# Patient Record
Sex: FEMALE | Race: BLACK OR AFRICAN AMERICAN | Employment: UNEMPLOYED | RURAL
[De-identification: names, ages, dates, MRNs, and addresses within clinical notes are randomized per-mention and may not be internally consistent; named-entity substitution may affect disease eponyms.]

---

## 2019-06-26 ENCOUNTER — HOME HEALTH ADMISSION (OUTPATIENT)
Dept: HOME HEALTH SERVICES | Facility: HOME HEALTH | Age: 64
End: 2019-06-26

## 2019-07-24 NOTE — TELEPHONE ENCOUNTER
Refill request for Docusate Sodium received in the [facility] office. The pt is not a [facility] pt nor is the provider a [facility] provider. The request was faxed back to the Pharmacy denied with the reason's why.  Heather Kraus RN

## 2021-10-12 ENCOUNTER — APPOINTMENT (OUTPATIENT)
Dept: CT IMAGING | Age: 66
End: 2021-10-12
Attending: EMERGENCY MEDICINE
Payer: MEDICARE

## 2021-10-12 ENCOUNTER — HOSPITAL ENCOUNTER (EMERGENCY)
Age: 66
Discharge: HOME | End: 2021-10-12
Attending: EMERGENCY MEDICINE
Payer: MEDICARE

## 2021-10-12 VITALS
TEMPERATURE: 97.5 F | HEART RATE: 61 BPM | DIASTOLIC BLOOD PRESSURE: 80 MMHG | RESPIRATION RATE: 20 BRPM | SYSTOLIC BLOOD PRESSURE: 113 MMHG | OXYGEN SATURATION: 98 %

## 2021-10-12 DIAGNOSIS — K59.00 CONSTIPATION, UNSPECIFIED CONSTIPATION TYPE: ICD-10-CM

## 2021-10-12 DIAGNOSIS — E87.6 HYPOKALEMIA: Primary | ICD-10-CM

## 2021-10-12 DIAGNOSIS — J90 PLEURAL EFFUSION: ICD-10-CM

## 2021-10-12 LAB
ALBUMIN SERPL-MCNC: 2.8 G/DL (ref 3.5–5)
ALBUMIN/GLOB SERPL: 0.5 {RATIO} (ref 1.1–2.2)
ALP SERPL-CCNC: 72 U/L (ref 45–117)
ALT SERPL-CCNC: 13 U/L (ref 12–78)
ANION GAP SERPL CALC-SCNC: 6 MMOL/L (ref 5–15)
AST SERPL-CCNC: 10 U/L (ref 15–37)
BASOPHILS # BLD: 0 K/UL (ref 0–0.1)
BASOPHILS NFR BLD: 0 % (ref 0–1)
BILIRUB SERPL-MCNC: 0.2 MG/DL (ref 0.2–1)
BUN SERPL-MCNC: 12 MG/DL (ref 6–20)
BUN/CREAT SERPL: 17 (ref 12–20)
CALCIUM SERPL-MCNC: 9 MG/DL (ref 8.5–10.1)
CHLORIDE SERPL-SCNC: 114 MMOL/L (ref 97–108)
CO2 SERPL-SCNC: 22 MMOL/L (ref 21–32)
COMMENT, HOLDF: NORMAL
CREAT SERPL-MCNC: 0.71 MG/DL (ref 0.55–1.02)
DIFFERENTIAL METHOD BLD: ABNORMAL
EOSINOPHIL # BLD: 0 K/UL (ref 0–0.4)
EOSINOPHIL NFR BLD: 0 % (ref 0–7)
ERYTHROCYTE [DISTWIDTH] IN BLOOD BY AUTOMATED COUNT: 18.1 % (ref 11.5–14.5)
GLOBULIN SER CALC-MCNC: 5.8 G/DL (ref 2–4)
GLUCOSE SERPL-MCNC: 100 MG/DL (ref 65–100)
HCT VFR BLD AUTO: 34.8 % (ref 35–47)
HGB BLD-MCNC: 11.2 G/DL (ref 11.5–16)
IMM GRANULOCYTES # BLD AUTO: 0 K/UL (ref 0–0.04)
IMM GRANULOCYTES NFR BLD AUTO: 0 % (ref 0–0.5)
LIPASE SERPL-CCNC: 173 U/L (ref 73–393)
LYMPHOCYTES # BLD: 0.7 K/UL (ref 0.8–3.5)
LYMPHOCYTES NFR BLD: 16 % (ref 12–49)
MAGNESIUM SERPL-MCNC: 2.1 MG/DL (ref 1.6–2.4)
MCH RBC QN AUTO: 28.2 PG (ref 26–34)
MCHC RBC AUTO-ENTMCNC: 32.2 G/DL (ref 30–36.5)
MCV RBC AUTO: 87.7 FL (ref 80–99)
MONOCYTES # BLD: 0.4 K/UL (ref 0–1)
MONOCYTES NFR BLD: 9 % (ref 5–13)
NEUTS SEG # BLD: 3.5 K/UL (ref 1.8–8)
NEUTS SEG NFR BLD: 75 % (ref 32–75)
NRBC # BLD: 0 K/UL (ref 0–0.01)
NRBC BLD-RTO: 0 PER 100 WBC
PLATELET # BLD AUTO: 271 K/UL (ref 150–400)
PMV BLD AUTO: 11.8 FL (ref 8.9–12.9)
POTASSIUM SERPL-SCNC: 3.2 MMOL/L (ref 3.5–5.1)
PROT SERPL-MCNC: 8.6 G/DL (ref 6.4–8.2)
RBC # BLD AUTO: 3.97 M/UL (ref 3.8–5.2)
RBC MORPH BLD: ABNORMAL
SAMPLES BEING HELD,HOLD: NORMAL
SODIUM SERPL-SCNC: 142 MMOL/L (ref 136–145)
WBC # BLD AUTO: 4.6 K/UL (ref 3.6–11)

## 2021-10-12 PROCEDURE — 74011250637 HC RX REV CODE- 250/637: Performed by: EMERGENCY MEDICINE

## 2021-10-12 PROCEDURE — 99284 EMERGENCY DEPT VISIT MOD MDM: CPT

## 2021-10-12 PROCEDURE — 83735 ASSAY OF MAGNESIUM: CPT

## 2021-10-12 PROCEDURE — 83690 ASSAY OF LIPASE: CPT

## 2021-10-12 PROCEDURE — 74011000636 HC RX REV CODE- 636: Performed by: EMERGENCY MEDICINE

## 2021-10-12 PROCEDURE — 36415 COLL VENOUS BLD VENIPUNCTURE: CPT

## 2021-10-12 PROCEDURE — 80053 COMPREHEN METABOLIC PANEL: CPT

## 2021-10-12 PROCEDURE — 85025 COMPLETE CBC W/AUTO DIFF WBC: CPT

## 2021-10-12 PROCEDURE — 74177 CT ABD & PELVIS W/CONTRAST: CPT

## 2021-10-12 RX ORDER — FACIAL-BODY WIPES
10 EACH TOPICAL
Qty: 28 EACH | Refills: 0 | Status: SHIPPED | OUTPATIENT
Start: 2021-10-12 | End: 2021-10-12 | Stop reason: SDUPTHER

## 2021-10-12 RX ORDER — LACTULOSE 10 G/15ML
10 SOLUTION ORAL; RECTAL
Qty: 480 ML | Refills: 0 | Status: SHIPPED | OUTPATIENT
Start: 2021-10-12 | End: 2021-10-12 | Stop reason: SDUPTHER

## 2021-10-12 RX ORDER — DOCUSATE SODIUM 100 MG/1
100 CAPSULE, LIQUID FILLED ORAL 2 TIMES DAILY
Qty: 60 CAPSULE | Refills: 2 | Status: SHIPPED | OUTPATIENT
Start: 2021-10-12 | End: 2022-01-10

## 2021-10-12 RX ORDER — LACTULOSE 10 G/15ML
10 SOLUTION ORAL; RECTAL
Qty: 480 ML | Refills: 0 | Status: SHIPPED | OUTPATIENT
Start: 2021-10-12 | End: 2021-10-20

## 2021-10-12 RX ORDER — FACIAL-BODY WIPES
10 EACH TOPICAL
Qty: 28 EACH | Refills: 0 | Status: SHIPPED | OUTPATIENT
Start: 2021-10-12

## 2021-10-12 RX ORDER — DOCUSATE SODIUM 100 MG/1
100 CAPSULE, LIQUID FILLED ORAL 2 TIMES DAILY
Qty: 60 CAPSULE | Refills: 2 | Status: SHIPPED | OUTPATIENT
Start: 2021-10-12 | End: 2021-10-12 | Stop reason: SDUPTHER

## 2021-10-12 RX ORDER — POTASSIUM CHLORIDE 750 MG/1
40 TABLET, FILM COATED, EXTENDED RELEASE ORAL
Status: COMPLETED | OUTPATIENT
Start: 2021-10-12 | End: 2021-10-12

## 2021-10-12 RX ADMIN — POTASSIUM CHLORIDE 40 MEQ: 750 TABLET, FILM COATED, EXTENDED RELEASE ORAL at 17:22

## 2021-10-12 RX ADMIN — IOPAMIDOL 100 ML: 755 INJECTION, SOLUTION INTRAVENOUS at 15:17

## 2021-10-12 NOTE — ED NOTES
Per  pt has been constipated x2 weeks. Pt has hx of NMO and is not currently taking any medications for this disease. Pt is bed bound but A/O x4.

## 2021-10-12 NOTE — PROGRESS NOTES
Date of previous inpatient admission/ ED visit? N/A    What brought the patient back to ED? Bloated, clearl gel bowel movement     Did patient decline recommended services during last admission/ ED visit (if yes, what)? N/A    Has patient seen a provider since their last inpatient admission/ED visit (if yes, when)?   N/A    PCP: First and Last name:         Name of Practice:    Are you a current patient: Yes/No:    Approximate date of last visit:    CM Interventions:    [facility] phone [phone]    ETA 2015    Sun Tamez, RN, BSN, [facility]  ED Care Management  [phone]

## 2021-10-12 NOTE — ED NOTES
Pt produced large bowel movement prior to enema administration. MD at bedside to assess pt's backside. Pt cleaned and repositioned on stretcher for comfort. Pt has no complaints at this time. Spoke with Case Management who is arranging for transport for pt.

## 2021-10-12 NOTE — ED NOTES
Bedside shift change report given to Jakob Baker RN (oncoming nurse) by [facility], [facility], RN (offgoing nurse). Report included the following information SBAR, Kardex, Intake/Output, MAR and Recent Results.

## 2021-10-12 NOTE — DISCHARGE INSTRUCTIONS
Follow closely with gastroenterology and return to the emergency department for any new or worsening symptoms. No evidence of mucus noted on rectal exam today.

## 2021-10-12 NOTE — ED PROVIDER NOTES
Patient is a 67-year-old female with past medical history significant for neuromyelitis optica, schizophrenia who presents emergency department accompanied by her  for evaluation of constipation. He states that they had an appointment with gastroenterology however had to miss it due to his work as he is her main caretaker. They note they were using some Dulcolax and MiraLAX to help however it seemed like the MiraLAX was not doing much. He does state that she does not eat fruits or vegetables very well stating that she was diagnosed with some dysfunction of the frontal lobe by neurology in association with her known neurological disorder. Additionally she does not ambulate and so this contributes to her having a hard time moving her bowels.            Past Medical History:   Diagnosis Date    Depression     Neuromyelitis optica (devic) (HCC)     Paranoid schizophrenia ([address]     Paranoid schizophrenia ([address]     Vision disturbance     bilateral       Past Surgical History:   Procedure Laterality Date    HX ORTHOPAEDIC      bunion         Family History:   Problem Relation Age of Onset    Heart Disease Mother     Heart Disease Brother     Stroke Maternal Aunt        Social History     Socioeconomic History    Marital status:      Spouse name: Not on file    Number of children: Not on file    Years of education: Not on file    Highest education level: Not on file   Occupational History    Not on file   Tobacco Use    Smoking status: Current Every Day Smoker     Packs/day: 0.50     Years: 20.00     Pack years: 10.00     Types: Cigarettes    Smokeless tobacco: Never Used   Substance and Sexual Activity    Alcohol use: No    Drug use: No    Sexual activity: Yes   Other Topics Concern    Not on file   Social History Narrative    Not on file     Social Determinants of Health     Financial Resource Strain:     Difficulty of Paying Living Expenses:    Food Insecurity:     Worried About Running Out of Food in the Last Year:    [address] in the Last Year:    Transportation Needs:     Lack of Transportation (Medical):  Lack of Transportation (Non-Medical):    Physical Activity:     Days of Exercise per Week:     Minutes of Exercise per Session:    Stress:     Feeling of Stress :    Social Connections:     Frequency of Communication with Friends and Family:     Frequency of Social Gatherings with Friends and Family:     Attends Jew Services:     Active Member of Clubs or Organizations:     Attends Club or Organization Meetings:     Marital Status:    Intimate Partner Violence:     Fear of Current or Ex-Partner:     Emotionally Abused:     Physically Abused:     Sexually Abused: ALLERGIES: Patient has no known allergies. Review of Systems   Constitutional: Negative for activity change, appetite change, chills, diaphoresis and fever. HENT: Negative for drooling. Respiratory: Negative for shortness of breath. Cardiovascular: Negative for chest pain. Gastrointestinal: Positive for abdominal distention and constipation. Negative for abdominal pain, blood in stool, nausea and vomiting. Skin: Negative for rash and wound. Neurological: Negative for seizures and syncope. Hematological: Does not bruise/bleed easily. Psychiatric/Behavioral: Negative. Vitals:    10/12/21 1345 10/12/21 1400 10/12/21 1415 10/12/21 1430   BP: 119/71 (!) 124/58 131/64 (!) 143/73   Pulse:  61     Resp:  18     Temp:       SpO2: 99% 100% 99% 99%            Physical Exam  Vitals and nursing note reviewed. Constitutional:       Appearance: Normal appearance. She is not toxic-appearing. Comments: Wheelchair-bound   HENT:      Head: Normocephalic and atraumatic. Eyes:      General: No scleral icterus. Cardiovascular:      Rate and Rhythm: Normal rate. Heart sounds: No friction rub.    Pulmonary:      Effort: Pulmonary effort is normal.      Breath sounds: Normal breath sounds. Abdominal:      Tenderness: There is no abdominal tenderness. There is no guarding or rebound. Genitourinary:     Rectum: Normal.      Comments: No gross bleeding or mucus noted. Patient had a large nonbloody pasty stool while in the ER. Musculoskeletal:      Cervical back: Neck supple. Skin:     General: Skin is warm and dry. Neurological:      Mental Status: She is alert and oriented to person, place, and time. Psychiatric:         Speech: Speech is delayed. MDM  Number of Diagnoses or Management Options  Constipation, unspecified constipation type  Hypokalemia  Diagnosis management comments: Patient and her  were advised of pleural effusions noted incidentally on imaging. She has no increased work of breathing and sats are 99%. They were advised close follow-up with her doctors and given strict return precautions.        Amount and/or Complexity of Data Reviewed  Clinical lab tests: reviewed           Procedures

## 2021-10-13 NOTE — ED NOTES
Discharged home with instructions given verbalized understanding, stable, VSS charted, transported via [facility] transport,

## 2023-05-11 RX ORDER — BISACODYL 10 MG
SUPPOSITORY, RECTAL RECTAL DAILY PRN
COMMUNITY
Start: 2021-10-12

## 2023-08-08 ENCOUNTER — HOSPITAL ENCOUNTER (EMERGENCY)
Facility: HOSPITAL | Age: 68
Discharge: HOME | End: 2023-08-09
Attending: EMERGENCY MEDICINE
Payer: MEDICARE

## 2023-08-08 ENCOUNTER — APPOINTMENT (OUTPATIENT)
Facility: HOSPITAL | Age: 68
End: 2023-08-08
Payer: MEDICARE

## 2023-08-08 DIAGNOSIS — K59.00 CONSTIPATION, UNSPECIFIED CONSTIPATION TYPE: ICD-10-CM

## 2023-08-08 DIAGNOSIS — L89.159 PRESSURE INJURY OF SKIN OF SACRAL REGION, UNSPECIFIED INJURY STAGE: Primary | ICD-10-CM

## 2023-08-08 LAB
BASOPHILS # BLD: 0 K/UL (ref 0–0.1)
BASOPHILS NFR BLD: 0 % (ref 0–1)
COMMENT:: NORMAL
DIFFERENTIAL METHOD BLD: ABNORMAL
EOSINOPHIL # BLD: 0.1 K/UL (ref 0–0.4)
EOSINOPHIL NFR BLD: 1 % (ref 0–7)
ERYTHROCYTE [DISTWIDTH] IN BLOOD BY AUTOMATED COUNT: 16.6 % (ref 11.5–14.5)
HCT VFR BLD AUTO: 37.8 % (ref 35–47)
HGB BLD-MCNC: 11.7 G/DL (ref 11.5–16)
IMM GRANULOCYTES # BLD AUTO: 0 K/UL (ref 0–0.04)
IMM GRANULOCYTES NFR BLD AUTO: 0 % (ref 0–0.5)
LYMPHOCYTES # BLD: 1 K/UL (ref 0.8–3.5)
LYMPHOCYTES NFR BLD: 22 % (ref 12–49)
MCH RBC QN AUTO: 28.8 PG (ref 26–34)
MCHC RBC AUTO-ENTMCNC: 31 G/DL (ref 30–36.5)
MCV RBC AUTO: 93.1 FL (ref 80–99)
MONOCYTES # BLD: 0.4 K/UL (ref 0–1)
MONOCYTES NFR BLD: 9 % (ref 5–13)
NEUTS SEG # BLD: 3.1 K/UL (ref 1.8–8)
NEUTS SEG NFR BLD: 68 % (ref 32–75)
NRBC # BLD: 0 K/UL (ref 0–0.01)
NRBC BLD-RTO: 0 PER 100 WBC
PLATELET # BLD AUTO: 324 K/UL (ref 150–400)
PMV BLD AUTO: 10.8 FL (ref 8.9–12.9)
RBC # BLD AUTO: 4.06 M/UL (ref 3.8–5.2)
SPECIMEN HOLD: NORMAL
WBC # BLD AUTO: 4.6 K/UL (ref 3.6–11)

## 2023-08-08 PROCEDURE — 99285 EMERGENCY DEPT VISIT HI MDM: CPT

## 2023-08-08 PROCEDURE — 85025 COMPLETE CBC W/AUTO DIFF WBC: CPT

## 2023-08-08 PROCEDURE — 80053 COMPREHEN METABOLIC PANEL: CPT

## 2023-08-08 PROCEDURE — 36415 COLL VENOUS BLD VENIPUNCTURE: CPT

## 2023-08-08 ASSESSMENT — PAIN - FUNCTIONAL ASSESSMENT: PAIN_FUNCTIONAL_ASSESSMENT: NONE - DENIES PAIN

## 2023-08-08 NOTE — ED TRIAGE NOTES
Patient arrives to ER with c/o bed sore to sacral and abd bloating. Patient does have hx of NMO and has been bed ridden for 5 yrs. Per patients  he thinks patient might be constipated, said she's had liquid stool but no solids in months.

## 2023-08-09 ENCOUNTER — APPOINTMENT (OUTPATIENT)
Facility: HOSPITAL | Age: 68
End: 2023-08-09
Payer: MEDICARE

## 2023-08-09 VITALS
WEIGHT: 219 LBS | HEIGHT: 64 IN | TEMPERATURE: 97.9 F | HEART RATE: 52 BPM | DIASTOLIC BLOOD PRESSURE: 71 MMHG | SYSTOLIC BLOOD PRESSURE: 134 MMHG | BODY MASS INDEX: 37.39 KG/M2 | RESPIRATION RATE: 18 BRPM | OXYGEN SATURATION: 97 %

## 2023-08-09 LAB
ALBUMIN SERPL-MCNC: 2.8 G/DL (ref 3.5–5)
ALBUMIN/GLOB SERPL: 0.5 (ref 1.1–2.2)
ALP SERPL-CCNC: 70 U/L (ref 45–117)
ALT SERPL-CCNC: 19 U/L (ref 12–78)
ANION GAP SERPL CALC-SCNC: 4 MMOL/L (ref 5–15)
AST SERPL-CCNC: 21 U/L (ref 15–37)
BILIRUB SERPL-MCNC: 0.3 MG/DL (ref 0.2–1)
BUN SERPL-MCNC: 9 MG/DL (ref 6–20)
BUN/CREAT SERPL: 16 (ref 12–20)
CALCIUM SERPL-MCNC: 9.4 MG/DL (ref 8.5–10.1)
CHLORIDE SERPL-SCNC: 110 MMOL/L (ref 97–108)
CO2 SERPL-SCNC: 26 MMOL/L (ref 21–32)
CREAT SERPL-MCNC: 0.56 MG/DL (ref 0.55–1.02)
GLOBULIN SER CALC-MCNC: 5.8 G/DL (ref 2–4)
GLUCOSE SERPL-MCNC: 105 MG/DL (ref 65–100)
POTASSIUM SERPL-SCNC: 4.4 MMOL/L (ref 3.5–5.1)
PROT SERPL-MCNC: 8.6 G/DL (ref 6.4–8.2)
SODIUM SERPL-SCNC: 140 MMOL/L (ref 136–145)

## 2023-08-09 PROCEDURE — 87070 CULTURE OTHR SPECIMN AEROBIC: CPT

## 2023-08-09 PROCEDURE — 87205 SMEAR GRAM STAIN: CPT

## 2023-08-09 PROCEDURE — 87186 SC STD MICRODIL/AGAR DIL: CPT

## 2023-08-09 PROCEDURE — 74177 CT ABD & PELVIS W/CONTRAST: CPT

## 2023-08-09 PROCEDURE — 87077 CULTURE AEROBIC IDENTIFY: CPT

## 2023-08-09 PROCEDURE — 87147 CULTURE TYPE IMMUNOLOGIC: CPT

## 2023-08-09 PROCEDURE — 6360000004 HC RX CONTRAST MEDICATION: Performed by: RADIOLOGY

## 2023-08-09 RX ORDER — DOCUSATE SODIUM 100 MG/1
100 CAPSULE, LIQUID FILLED ORAL 2 TIMES DAILY
Qty: 40 CAPSULE | Refills: 0 | Status: SHIPPED | OUTPATIENT
Start: 2023-08-09 | End: 2023-08-29

## 2023-08-09 RX ORDER — LACTULOSE 10 G/15ML
20 SOLUTION ORAL 2 TIMES DAILY PRN
Qty: 237 ML | Refills: 1 | Status: SHIPPED | OUTPATIENT
Start: 2023-08-09

## 2023-08-09 RX ORDER — DOCUSATE SODIUM 100 MG/1
100 CAPSULE, LIQUID FILLED ORAL 2 TIMES DAILY
Qty: 40 CAPSULE | Refills: 0 | Status: SHIPPED | OUTPATIENT
Start: 2023-08-09 | End: 2023-08-09 | Stop reason: SDUPTHER

## 2023-08-09 RX ADMIN — IOPAMIDOL 100 ML: 755 INJECTION, SOLUTION INTRAVENOUS at 00:29

## 2023-08-09 NOTE — ED NOTES
Discharge paperwork reviewed with pt & questions answered. IV taken out. [facility] here to take pt home.      Jiaden Means RN  08/09/23 0880

## 2023-08-09 NOTE — DISCHARGE INSTRUCTIONS
Recommendations:    Right posterior thigh/gluteal cleft- Daily moisten 4 x 4 with VASHE (blue bottle), place in wound and let sit for 5 to 10 minutes, use same gauze to cleanse wound, apply nickel thickness of Santyl ointment to necrotic (brown/tan) tissue, loosely fill with roll gauze, apply zinc oxide to breakdown around wound and cover. Gluteal Cleft- Every other day cleanse with VASHE, cut a small piece of Puracol AG and place in wound and cover. Right back- Every 12 hours gently wipe off ointment, apply VASHE moistened gauze to entire area and let sit for 5 to 10 minutes, apply antifungal ointment. Lower legs and feet- Every 12 hours gently cleanse using a wash cloth to remove loose skin, apply Lac-Hydrin lotion. Skin Care & Pressure Prevention:  Minimize layers of linen/pads under patient to optimize support surface. Turn/reposition approximately every 2 hours and offload heels.   Manage incontinence / promote continence   Nourishing Skin Cream to dry skin, minimize use of briefs when able     Discussed above plan with patient & Yohana Peter RN

## 2023-08-09 NOTE — ED PROVIDER NOTES
[facility] [place] EMERGENCY DEP  EMERGENCY DEPARTMENT ENCOUNTER      Pt Name: Joe Scott  MRN: [id number]  [address] [facility] 1955  Date of evaluation: 8/8/2023  Provider: Jerel Ash MD    [address]       Chief Complaint   Patient presents with    Wound Check         HISTORY OF PRESENT ILLNESS   (Location/Symptom, Timing/Onset, Context/Setting, Quality, Duration, Modifying Factors, Severity)  Note limiting factors. Patient is a 70-year-old female with history of neuromyelitis optica, paranoid schizophrenia, depression who presents with wound to right buttock.  reports that he takes care of her, but is unable to prevent the progression of the wound. He says he called [facility] wound care and they gave him an appointment for August 22 but he cannot wait this long. He denies any purulent drainage from wound, any fevers, chills, redness to the area. Patient reports there is no recent change in patient's stools. The history is provided by the spouse and a caregiver. Nursing Notes were reviewed. REVIEW OF SYSTEMS    Not Required     Review of Systems    PAST MEDICAL HISTORY     Past Medical History:   Diagnosis Date    Depression     Neuromyelitis optica (devic) ([address])     Paranoid schizophrenia ([address])     Paranoid schizophrenia ([address])     Vision disturbance     bilateral       SURGICAL HISTORY       Past Surgical History:   Procedure Laterality Date    ORTHOPEDIC SURGERY      bunion       CURRENT MEDICATIONS       Previous Medications    BISACODYL (DULCOLAX) 10 MG SUPPOSITORY    Place rectally daily as needed       ALLERGIES     Patient has no known allergies.     FAMILY HISTORY       Family History   Problem Relation Age of Onset    Heart Disease Mother     Heart Disease Brother     Stroke Maternal Aunt         SOCIAL HISTORY       Social History     Socioeconomic History    Marital status:    Tobacco Use    Smoking status: Every Day     Packs/day: 0.50     Types: Cigarettes

## 2023-08-09 NOTE — WOUND CARE
Wound Care Note:     New consult placed by physician request for right buttock wound    Chart shows: Will not be admitted. Past Medical History:   Diagnosis Date    Depression     Neuromyelitis optica (devic) ([address])     Paranoid schizophrenia ([address])     Paranoid schizophrenia ([address])     Vision disturbance     bilateral     - blind    - paralysis    WBC = 4.6 on 8/8/23  Admitted from home    Assessment:   Patient is alert and talking, incontinent with moderate assistance needed in repositioning. Bed: ED Stretcher  Patient wearing briefs for incontinence. Diet: Adult diet regular; 4  carb choices  Patient reports no pain. Bilateral heels, buttocks, and sacral skin intact and without erythema. 1. POA right thigh/gluteal fold with stage 3 pressure injury measuring 3.5 cm x 5.5 cm x 2.3 cm, wound bed with slough along wound edges and pink wound bed, moderate serous drainage, wound edges are open, glenis-wound with some hyperpigmentation. Wet to dry dressing applied. Santyl ointment and specialty bed to be ordered. 2.  POA gluteal cleft fissure measures 0.8 cm x 0.1 cm x 0.4 cm, visible wound bed is pink, moist, no drainage, wound edges are open, glenis-wound intact. Puracol AG placed in wound and covered by small sacral foam.    3.  POA right back with areas of erythema, entire area measures 11.5 cm x 27 cm x 0.1 cm, hyperpigmented circular area with raised erythema proximal and distal [place],  stated is a form of yeast.  Antifungal ointment to be ordered. Spoke with Dr. Travis Malone, patient will not be admitted; wound care orders obtained. Patient repositioned on left side. Heels offloaded on pillows.      Recommendations:    Right posterior thigh/gluteal cleft- Daily moisten 4 x 4 with VASHE (blue bottle), place in wound and let sit for 5 to 10 minutes, use same gauze to cleanse wound, apply nickel thickness of Santyl ointment to

## 2023-08-09 NOTE — ED NOTES
Bedside shift change report given to [address] (oncoming nurse) by Carmencita Spears RN (offgoing nurse). Report included the following information ED Encounter Summary, ED SBAR, MAR, and Recent Results.        Rocio Robles RN  08/09/23 2199

## 2023-08-09 NOTE — ED PROVIDER NOTES
Patient signed out to me by Dr. Chitra Valenzuela at 7 AM pending evaluation by wound care and case management for sacral decubitus ulcer. No evidence of infection. Patient likely stable for discharge home when she is established with wound care. 11:14 AM  Patient seen in the ED by wound care nurse. She applied ointments and discussed home wound care. Prescriptions were written per her recommendations. Patient stable for discharge home. They have an appointment with the outpatient wound clinic.      Shabnam Lockhart MD  08/09/23 1646

## 2023-08-09 NOTE — CARE COORDINATION
8/9/2023 -    notes CM Consult for Patient Needing Wound Care. CM notes that patient was seen by Wound Care RN at ED bedside. Home wound care recommendations were provided, and ED Attending submitted needed scripts to appropriate pharmacy. Per chart review, patient has a [address] appointment scheduled for 8/22. Per ED Attending, patient will not require [facility] SN Wound Care. Disposition is for discharge to own home environment with transport and support from family. CM Consult completed and no additional needs noted at this time.     CRM: Jonn Sánchez, MPH, [address] [place] [address] St: [phone] or [phone]

## 2023-08-12 LAB
BACTERIA SPEC CULT: ABNORMAL
GRAM STN SPEC: ABNORMAL
GRAM STN SPEC: ABNORMAL
SERVICE CMNT-IMP: ABNORMAL

## 2023-08-12 RX ORDER — DOXYCYCLINE HYCLATE 100 MG
100 TABLET ORAL 2 TIMES DAILY
Qty: 28 TABLET | Refills: 0 | Status: SHIPPED | OUTPATIENT
Start: 2023-08-12 | End: 2023-08-26

## 2023-08-17 ENCOUNTER — HOME HEALTH ADMISSION (OUTPATIENT)
Dept: HOME HEALTH SERVICES | Facility: HOME HEALTH | Age: 68
End: 2023-08-17
Payer: MEDICARE

## 2023-08-19 ENCOUNTER — HOME CARE VISIT (OUTPATIENT)
Facility: HOME HEALTH | Age: 68
End: 2023-08-19

## 2023-08-19 VITALS
SYSTOLIC BLOOD PRESSURE: 136 MMHG | OXYGEN SATURATION: 97 % | DIASTOLIC BLOOD PRESSURE: 65 MMHG | TEMPERATURE: 97.1 F | HEART RATE: 60 BPM | RESPIRATION RATE: 18 BRPM

## 2023-08-19 PROCEDURE — 0221000100 HH NO PAY CLAIM PROCEDURE

## 2023-08-19 PROCEDURE — G0299 HHS/HOSPICE OF RN EA 15 MIN: HCPCS

## 2023-08-19 ASSESSMENT — ENCOUNTER SYMPTOMS
BOWEL INCONTINENCE: 1
STOOL DESCRIPTION: SOFT

## 2023-08-19 NOTE — HOME HEALTH
Skilled reason for admission/summary of clinical condition: left buttock and cleft wounds admitted to home care for SN. Nursing needed for med management, wound care. Diagnosis: left buttock and cleft wounds  Subjective: caregiver reports wound difficult to treat, has been attempting to resolve it on his own  Caregiver: spouse. Caregiver assists with: Medications, Meals, Bathing, ADL, IADL, Wound Care, Transportation and Housekeeping Caregiver unable to assist with: na. Caregiver is available 24 hours/day Caregiver is  present at this visit and did participate with clinician. Medications reconciled and all medications are available in the home this visit. The following education was provided regarding medications: medication interactions and look alike medications: na. Patient/CG able to demonstrate knowledge through teach back with 75 percent accuracy. Medications are effective at this time. MD notified of any discrepancies/medication interactions na. A list of reconciled medications has been given to the patient/caregiver  and a copy has been uploaded to media. Home health supplies by type and quantity ordered/delivered this visit include: wound supplies  Patient/caregiver instructed on plan of care and are agreeable to plan of care at this time. Clinician reviewed orientation to home health booklet with patient/caregiver including agency phone number, agency complaint process, state hotline number, as well as joint commission's quality hotline number. Consent forms signed. Patient at risk for falls Yes:   Recommended requesting PT orders due to fall risk YES: patient bed bound, caregiver would like assist in coming up with new equipment  Patient response to recommended requesting of PT orders: agreeeable    Discharge planning discussed with patient and caregiver. Discharge planning as follows: When wound is 90% healed Patient/caregiver did verbalize agreement with discharge planning.

## 2023-08-20 ENCOUNTER — HOME CARE VISIT (OUTPATIENT)
Facility: HOME HEALTH | Age: 68
End: 2023-08-20

## 2023-08-20 VITALS
RESPIRATION RATE: 18 BRPM | SYSTOLIC BLOOD PRESSURE: 124 MMHG | DIASTOLIC BLOOD PRESSURE: 84 MMHG | TEMPERATURE: 97.8 F | OXYGEN SATURATION: 98 % | HEART RATE: 58 BPM

## 2023-08-20 PROCEDURE — G0300 HHS/HOSPICE OF LPN EA 15 MIN: HCPCS

## 2023-08-20 ASSESSMENT — ENCOUNTER SYMPTOMS
BOWEL INCONTINENCE: 1
STOOL DESCRIPTION: LOOSE

## 2023-08-22 ENCOUNTER — HOME CARE VISIT (OUTPATIENT)
Facility: HOME HEALTH | Age: 68
End: 2023-08-22

## 2023-08-22 VITALS
HEART RATE: 61 BPM | SYSTOLIC BLOOD PRESSURE: 130 MMHG | DIASTOLIC BLOOD PRESSURE: 76 MMHG | TEMPERATURE: 99.3 F | OXYGEN SATURATION: 98 % | RESPIRATION RATE: 16 BRPM

## 2023-08-22 PROCEDURE — G0300 HHS/HOSPICE OF LPN EA 15 MIN: HCPCS

## 2023-08-23 NOTE — HOME HEALTH
Subjective: \" I'm her sister in law, I am helping out\"  Falls since last visit No(if yes complete the Fall Tracking Form and include bsrifallreport):   Caregiver involvement changes: none  Home health supplies by type and quantity ordered/delivered this visit include: none    Clinician asked if patient has had any physician contact since last home care visit and patient states: NO  Clinician asked if patient has any new or changed medications and patient states:  NO   If Yes, were medications reconciled? NO   Was the certifying physician notified of changes in medications? N/A     Clinical assessment (what this visit means for the patient overall and need for ongoing skilled care) and progress or lack of progress towards SPECIFIC goals: Patient remains at risk/hospitalization due to infection due to wound. Continued need of wound care. Written Teaching Material Utilized: N/A    Interdisciplinary communication with: N/A     Discharge planning as follows: When wound is 100% healed    Specific plan for next visit: wound care and education as needed.

## 2023-08-24 ENCOUNTER — HOME CARE VISIT (OUTPATIENT)
Facility: HOME HEALTH | Age: 68
End: 2023-08-24

## 2023-08-24 PROCEDURE — G0299 HHS/HOSPICE OF RN EA 15 MIN: HCPCS

## 2023-08-25 ENCOUNTER — HOME CARE VISIT (OUTPATIENT)
Facility: HOME HEALTH | Age: 68
End: 2023-08-25

## 2023-08-25 VITALS
OXYGEN SATURATION: 97 % | TEMPERATURE: 97.4 F | RESPIRATION RATE: 15 BRPM | HEART RATE: 62 BPM | SYSTOLIC BLOOD PRESSURE: 132 MMHG | DIASTOLIC BLOOD PRESSURE: 78 MMHG

## 2023-08-25 VITALS
SYSTOLIC BLOOD PRESSURE: 100 MMHG | OXYGEN SATURATION: 97 % | DIASTOLIC BLOOD PRESSURE: 68 MMHG | TEMPERATURE: 97.1 F | RESPIRATION RATE: 18 BRPM | HEART RATE: 64 BPM

## 2023-08-25 PROCEDURE — G0151 HHCP-SERV OF PT,EA 15 MIN: HCPCS

## 2023-08-25 ASSESSMENT — ENCOUNTER SYMPTOMS: BOWEL INCONTINENCE: 1

## 2023-08-25 NOTE — HOME HEALTH
Subjective: Patient denies pain   Falls since last visit No(if yes complete the Fall Tracking Form and include bsrifallreport):   Caregiver involvement changes: patient has paid CG during day while  works  Home health supplies by type and quantity ordered/delivered this visit include: Collagen ordered    Clinician asked if patient has had any physician contact since last home care visit and patient states: NO  Clinician asked if patient has any new or changed medications and patient states:  NO   If Yes, were medications reconciled? N/A   Was the certifying physician notified of changes in medications? N/A     Clinical assessment (what this visit means for the patient overall and need for ongoing skilled care) and progress or lack of progress towards SPECIFIC goals: Patient seen for wound care right upper thigh and sacral wounds. Medline order said it was delivered yesterday, but CG unable to find box. CG eventually found box of supplies upstairs which she opened, but could not locate collagen. Pt. has ample supplies in home of Vashe, foam dressings, gauze, cotton tip applicators, but unable to locate collagen. Wound to right upper thigh pink/red with depth, tunneling and undermining. Order stated to apply santyl to necrotic areas; no santyl applied as no necrotic areas noted.  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 SN visits are needed for wound care. Written Teaching Material Utilized: N/A    Interdisciplinary communication with: Jennifer Prince RN for wound consultation and MD regarding wound care     Discharge planning as follows:  When wound is 100% healed    Specific plan for next visit: wound care

## 2023-08-25 NOTE — HOME HEALTH
possible hand splinting    Written Teaching Material Utilized: STOP LIGHT TOOL in admission handbook    Specific plan for next visit: instruct different cg in ROM ex    Plan of care and admission to home health status called to attending physician. The following practitioner has agreed to sign the ongoing POC , Sally Rosenthal NP and was notified of the following: PT: 1w3; recommend OT consult    Interdisciplinary communication with: n/a    PCP: Sally Rosenthal NP  Next scheduled doctor appointment: TBD; Patient/Caregiver instructed to keep follow up appointment because lack of follow through with physician appointments could result in discontinuation of home care services for non-compliance. Patient/Caregiver verbalize knowledge of above through teach back with 100 percent accuracy.

## 2023-08-27 ASSESSMENT — ENCOUNTER SYMPTOMS: BOWEL INCONTINENCE: 1

## 2023-08-29 ENCOUNTER — HOME CARE VISIT (OUTPATIENT)
Facility: HOME HEALTH | Age: 68
End: 2023-08-29

## 2023-08-29 VITALS
HEART RATE: 64 BPM | OXYGEN SATURATION: 98 % | DIASTOLIC BLOOD PRESSURE: 70 MMHG | RESPIRATION RATE: 16 BRPM | TEMPERATURE: 98.6 F | SYSTOLIC BLOOD PRESSURE: 132 MMHG

## 2023-08-29 PROCEDURE — G0299 HHS/HOSPICE OF RN EA 15 MIN: HCPCS

## 2023-08-29 ASSESSMENT — ENCOUNTER SYMPTOMS: BOWEL INCONTINENCE: 1

## 2023-08-30 ENCOUNTER — HOME CARE VISIT (OUTPATIENT)
Facility: HOME HEALTH | Age: 68
End: 2023-08-30

## 2023-08-30 VITALS
DIASTOLIC BLOOD PRESSURE: 60 MMHG | TEMPERATURE: 98.2 F | RESPIRATION RATE: 17 BRPM | SYSTOLIC BLOOD PRESSURE: 110 MMHG | OXYGEN SATURATION: 96 % | HEART RATE: 89 BPM

## 2023-08-30 PROCEDURE — G0152 HHCP-SERV OF OT,EA 15 MIN: HCPCS

## 2023-08-30 NOTE — HOME HEALTH
Subjective: \"What are you here for\"  Falls since last visit No(if yes complete the Fall Tracking Form and include bsrifallreport):   Caregiver involvement changes: no changes  Home health supplies by type and quantity ordered/delivered this visit include: supplies in the home    Clinician asked if patient has had any physician contact since last home care visit and patient states: NO  Clinician asked if patient has any new or changed medications and patient states:  NO   If Yes, were medications reconciled? N/A   Was the certifying physician notified of changes in medications? N/A     Clinical assessment (what this visit means for the patient overall and need for ongoing skilled care) and progress or lack of progress towards SPECIFIC goals: patient is bedbound and is being seen for wound care to right buttock and sacral cleft wound care. upon assessment of right buttock wound no necrotic tissue was noted. wound measurements are significantly larger than previous wound measurements. wound bed is pink/red. santyl is no longer appropriate for wound care. wound is deep and current wound care orders are no longer appropriate. working with Sonja Kelly RN [facility] to attempt to obtain wound vac orders. spoke with  on the phone after SN visit had been completed and updated him on todays findings. educated  to stop using santyl at this time as there is no longer a need. informed  that new wound care orders were being obtained and that he would be updated once orders are received. patients assessment is otherwise unremarkable. SN visits continue to be needed for wound care. VS within defined parameters, assessment completed, wound care completed    Written Teaching Material Utilized: N/A    Interdisciplinary communication with: Liborio Mcgraw DO office closed when call made for wound care orders. Will attempt again tomorrow.  Sonja Kelly RN for the purpose of assistance with obtaining

## 2023-08-31 ENCOUNTER — HOME CARE VISIT (OUTPATIENT)
Facility: HOME HEALTH | Age: 68
End: 2023-08-31

## 2023-08-31 PROCEDURE — G0151 HHCP-SERV OF PT,EA 15 MIN: HCPCS

## 2023-08-31 NOTE — HOME HEALTH
Skilled reason for admission/summary of clinical condition:Pt is a 77 y.o female who presents for OT evaluation following referral to assess UE contracatures. PMH includes: neuromyelitis optica, paranoid schizophrenia, open wound L buttock. Pt lives with  in 2 story home with 3STE. Pt is bedfast and stays on first floor of home in a hospital bed. Pt's  works during the day and she has paid caregivers assisting during that time. Pt has hospital bed, deja lift, high back reclining w/c. Diagnosis:  open wound left buttock  Subjective: Pt denies any pain. Caregiver: spouse, paid caregivers. Caregiver assists with: Medications, Meals, Bathing, ADL, IADL, Wound Care and Housekeeping Caregiver unable to assist with: n/a. Caregiver is available 24 hours/day Caregiver is  present at this visit and did participate with clinician. Medications reconciled and all medications are available in the home this visit. The following education was provided regarding medications: medication interactions and look alike medications: continue taking meds as prescribed. Home health supplies by type and quantity ordered/delivered this visit include: OT provided resource info to pt's  for ordering bilateral resting hand splints. Patient/caregiver instructed on plan of care and are agreeable to plan of care at this time. Discharge planning discussed with patient and caregiver. Discharge planning as follows: D/C home with 24hr caregiver assistance under supervision of PCP When goals are met/max potential is achevied. Patient/caregiver did verbalize agreement with discharge planning. Clinical Assessment (What this means for the patient overall and need for ongoing skilled care): Pt presents with LE paralysis and blindness. She is bedbound and requires use of deja lift for transfers. She has a high back reclining w/c but only gets up in w/c when she goes out for MD rodrigez.  Pt has paid caregivers

## 2023-09-01 VITALS
OXYGEN SATURATION: 98 % | DIASTOLIC BLOOD PRESSURE: 80 MMHG | RESPIRATION RATE: 14 BRPM | TEMPERATURE: 97.6 F | HEART RATE: 64 BPM | SYSTOLIC BLOOD PRESSURE: 128 MMHG

## 2023-09-01 NOTE — HOME HEALTH
Subjective: pt lying in bed comfortably; cg present  Falls since last visit   NO  (if yes complete the Fall Tracking Form and include bsrifallreport):   Caregiver involvement changes: none  Home health supplies by type and quantity ordered/delivered this visit include:n/a    Clinician asked if patient has had any physician contact since last home care visit and patient states: NO  Clinician asked if patient has any new or changed medications and patient states:  NO  If Yes, were medications reconciled?  n/a  Was the certifying physician notified of changes in medications? n/a    Clinical assessment (what this visit means for the patient overall and need for ongoing skilled care) and progress or lack of progress towards SPECIFIC goals: private cgs being instructed in PROM to all extremities    Written Teaching Material Utilized: written HEP    Interdisciplinary communication with: Poli Aguilar OT for the purpose of collaboration    Discharge planning as follows: pt to remain at home with assist of caregiver and guidance from MD    Specific plan for next visit: instruct different cg in HEP;  ANNA

## 2023-09-02 ENCOUNTER — HOME CARE VISIT (OUTPATIENT)
Dept: HOME HEALTH SERVICES | Facility: HOME HEALTH | Age: 68
End: 2023-09-02
Payer: MEDICARE

## 2023-09-04 ENCOUNTER — HOME CARE VISIT (OUTPATIENT)
Facility: HOME HEALTH | Age: 68
End: 2023-09-04
Payer: MEDICARE

## 2023-09-04 PROCEDURE — G0299 HHS/HOSPICE OF RN EA 15 MIN: HCPCS

## 2023-09-06 ENCOUNTER — HOME CARE VISIT (OUTPATIENT)
Facility: HOME HEALTH | Age: 68
End: 2023-09-06

## 2023-09-06 VITALS
SYSTOLIC BLOOD PRESSURE: 112 MMHG | TEMPERATURE: 98.3 F | DIASTOLIC BLOOD PRESSURE: 68 MMHG | HEART RATE: 63 BPM | OXYGEN SATURATION: 96 % | RESPIRATION RATE: 18 BRPM

## 2023-09-06 PROCEDURE — G0300 HHS/HOSPICE OF LPN EA 15 MIN: HCPCS

## 2023-09-06 ASSESSMENT — ENCOUNTER SYMPTOMS: BOWEL INCONTINENCE: 1

## 2023-09-06 NOTE — HOME HEALTH
Subjective: I dont have any pain. Falls since last visit No(if yes complete the Fall Tracking Form and include bsrifallreport):   Caregiver involvement changes: private aide present  Home health supplies by type and quantity ordered/delivered this visit include: puracol, 5x5 foam, skin prep pads    Clinician asked if patient has had any physician contact since last home care visit and patient states: NO  Clinician asked if patient has any new or changed medications and patient states:  NO   If Yes, were medications reconciled? N/A   Was the certifying physician notified of changes in medications? N/A     Clinical assessment (what this visit means for the patient overall and need for ongoing skilled care) and progress or lack of progress towards SPECIFIC goals: SN visited pt today for wound care. Pt is at risk for infection related to wounds and immobility. Wound care performed, no s/s of infection noted. Wound healing goals not met. SN will continue with wound care per MD orders. Written Teaching Material Utilized: N/A    Interdisciplinary communication with: N/A     Discharge planning as follows:  When wound is 100% healed    Specific plan for next visit: Wound care

## 2023-09-06 NOTE — HOME HEALTH
Per communication with pt's , she has not yet received her hand splints. OT visit cancelled for this week d/t waiting on splints. Physician notified.

## 2023-09-07 ENCOUNTER — HOME CARE VISIT (OUTPATIENT)
Facility: HOME HEALTH | Age: 68
End: 2023-09-07

## 2023-09-07 PROCEDURE — G0151 HHCP-SERV OF PT,EA 15 MIN: HCPCS

## 2023-09-08 ENCOUNTER — HOME CARE VISIT (OUTPATIENT)
Dept: HOME HEALTH SERVICES | Facility: HOME HEALTH | Age: 68
End: 2023-09-08

## 2023-09-08 ENCOUNTER — HOME CARE VISIT (OUTPATIENT)
Facility: HOME HEALTH | Age: 68
End: 2023-09-08

## 2023-09-08 VITALS
SYSTOLIC BLOOD PRESSURE: 110 MMHG | TEMPERATURE: 97.6 F | OXYGEN SATURATION: 98 % | RESPIRATION RATE: 16 BRPM | DIASTOLIC BLOOD PRESSURE: 70 MMHG | HEART RATE: 66 BPM

## 2023-09-08 VITALS
SYSTOLIC BLOOD PRESSURE: 126 MMHG | RESPIRATION RATE: 16 BRPM | HEART RATE: 68 BPM | DIASTOLIC BLOOD PRESSURE: 68 MMHG | TEMPERATURE: 98 F | OXYGEN SATURATION: 97 %

## 2023-09-08 PROCEDURE — G0299 HHS/HOSPICE OF RN EA 15 MIN: HCPCS

## 2023-09-08 ASSESSMENT — ENCOUNTER SYMPTOMS: BOWEL INCONTINENCE: 1

## 2023-09-09 NOTE — HOME HEALTH
Subjective: new cg present today for instruction in [address] since last visit   NO    (if yes complete the Fall Tracking Form and include bsrifallreport):   Caregiver involvement changes: none  Home health supplies by type and quantity ordered/delivered this visit include: n/a    Clinician asked if patient has had any physician contact since last home care visit and patient states: NO  Clinician asked if patient has any new or changed medications and patient states:  NO  If Yes, were medications reconciled? n/a  Was the certifying physician notified of changes in medications?  n/a    Clinical assessment (what this visit means for the patient overall and need for ongoing skilled care) and progress or lack of progress towards SPECIFIC goals:  pt ready for DC from PT; goals met    Written Teaching Material Utilized: written HEP    Interdisciplinary communication with: n/a    Discharge planning as follows: pt to remain at home with assist of caregiver and guidance from MD    Specific plan for next visit:  [facility]

## 2023-09-10 VITALS
RESPIRATION RATE: 16 BRPM | OXYGEN SATURATION: 97 % | HEART RATE: 82 BPM | SYSTOLIC BLOOD PRESSURE: 128 MMHG | DIASTOLIC BLOOD PRESSURE: 72 MMHG | TEMPERATURE: 97.7 F

## 2023-09-11 ENCOUNTER — HOME CARE VISIT (OUTPATIENT)
Facility: HOME HEALTH | Age: 68
End: 2023-09-11
Payer: MEDICARE

## 2023-09-11 VITALS
DIASTOLIC BLOOD PRESSURE: 70 MMHG | RESPIRATION RATE: 16 BRPM | TEMPERATURE: 97.8 F | SYSTOLIC BLOOD PRESSURE: 118 MMHG | HEART RATE: 69 BPM | OXYGEN SATURATION: 96 %

## 2023-09-11 PROCEDURE — G0300 HHS/HOSPICE OF LPN EA 15 MIN: HCPCS

## 2023-09-11 ASSESSMENT — ENCOUNTER SYMPTOMS: BOWEL INCONTINENCE: 1

## 2023-09-11 NOTE — HOME HEALTH
Subjective: No pain  Falls since last visit No(if yes complete the Fall Tracking Form and include bsrifallreport):   Caregiver involvement changes: private aide present  Home health supplies by type and quantity ordered/delivered this visit include: Vashe, calcium alginate rope    Clinician asked if patient has had any physician contact since last home care visit and patient states: NO  Clinician asked if patient has any new or changed medications and patient states:  NO   If Yes, were medications reconciled? N/A   Was the certifying physician notified of changes in medications? N/A     Clinical assessment (what this visit means for the patient overall and need for ongoing skilled care) and progress or lack of progress towards SPECIFIC goals: Sn visited pt today for wound care. Still waiting on wound vac. Wound care performed to 3 areas. Wound healing goals not met. SN will continue to see for wound care per MD orders. Written Teaching Material Utilized: N/A    Interdisciplinary communication with:  RN for the purpose of wound    Discharge planning as follows:  When wound is 100% healed and When patient is no longer able to participate or progresses to SNF/Hospice    Specific plan for next visit: Wound care

## 2023-09-13 ENCOUNTER — HOME CARE VISIT (OUTPATIENT)
Facility: HOME HEALTH | Age: 68
End: 2023-09-13
Payer: MEDICARE

## 2023-09-13 VITALS
OXYGEN SATURATION: 97 % | RESPIRATION RATE: 16 BRPM | TEMPERATURE: 97.8 F | DIASTOLIC BLOOD PRESSURE: 76 MMHG | HEART RATE: 69 BPM | SYSTOLIC BLOOD PRESSURE: 120 MMHG

## 2023-09-13 PROCEDURE — G0300 HHS/HOSPICE OF LPN EA 15 MIN: HCPCS

## 2023-09-13 ASSESSMENT — ENCOUNTER SYMPTOMS: BOWEL INCONTINENCE: 1

## 2023-09-13 NOTE — HOME HEALTH
Subjective: I like to lie on my back  Falls since last visit No(if yes complete the Fall Tracking Form and include bsrifallreport):   Caregiver involvement changes: no changes   Home health supplies by type and quantity ordered/delivered this visit include: Called [employer] and discussed wound vac order status and reached out to care team regarding order     Clinician asked if patient has had any physician contact since last home care visit and patient states: YES  Clinician asked if patient has any new or changed medications and patient states:  NO   If Yes, were medications reconciled? NO   Was the certifying physician notified of changes in medications? N/A     Clinical assessment (what this visit means for the patient overall and need for ongoing skilled care) and progress or lack of progress towards SPECIFIC goals: Patient at risk for hospitalization due to open wound, increased risk for infection, decreased mobility and requires continued SN services for wound care and assessment and education     Written Teaching Material Utilized: N/A    Interdisciplinary communication with: Darian Pryor RN and Liss Miner RN for the purpose of wound vac supplies    Discharge planning as follows:  When goals are met    Specific plan for next visit: Instruct caregiver/patient in pressure relief measures, increased protein in diet to promote wound healing, wound care, assessment and education

## 2023-09-13 NOTE — HOME HEALTH
Subjective: I feel fine  Falls since last visit No(if yes complete the Fall Tracking Form and include bsrifallreport):   Caregiver involvement changes: private aide present  Home health supplies by type and quantity ordered/delivered this visit include: puracol rope    Clinician asked if patient has had any physician contact since last home care visit and patient states: NO  Clinician asked if patient has any new or changed medications and patient states:  NO   If Yes, were medications reconciled? N/A   Was the certifying physician notified of changes in medications? N/A     Clinical assessment (what this visit means for the patient overall and need for ongoing skilled care) and progress or lack of progress towards SPECIFIC goals: Sn visited patient today for wound care with Sonja Kelly RN Wound Educator. Patient is bed bound and resting on an Rabun Bunnell. Patient has private aides with her at home during the week. SN re-educated pcg on repositioning every 2 hours and brief changes every 2-3 hours and as needed to help with skin integrity. Wound care performed to right buttock and sacral, measurements and pictures also performed. vital signs are unremarkable. Wound healing goals not met. Pt requires SN to continue for wound care, education and assessments. Pt currently awaiting wound vac. Written Teaching Material Utilized: N/A    Interdisciplinary communication with: N/A    Discharge planning as follows: When wound is 100% healed, When patient is no longer able to participate or progresses to SNF/Hospice and Will discharge when the patient has reached their maximum functional potential and maximum safety in their home    Specific plan for next visit: Educate in s/s of infection and when to call [facility], MD or 911, and Wound care / wound vac if arrived and education to CG on wound vac.

## 2023-09-14 ENCOUNTER — HOME CARE VISIT (OUTPATIENT)
Dept: HOME HEALTH SERVICES | Facility: HOME HEALTH | Age: 68
End: 2023-09-14
Payer: MEDICARE

## 2023-09-14 ENCOUNTER — HOME CARE VISIT (OUTPATIENT)
Facility: HOME HEALTH | Age: 68
End: 2023-09-14
Payer: MEDICARE

## 2023-09-15 ENCOUNTER — HOME CARE VISIT (OUTPATIENT)
Facility: HOME HEALTH | Age: 68
End: 2023-09-15
Payer: MEDICARE

## 2023-09-15 VITALS
TEMPERATURE: 97.2 F | SYSTOLIC BLOOD PRESSURE: 128 MMHG | HEART RATE: 65 BPM | OXYGEN SATURATION: 97 % | DIASTOLIC BLOOD PRESSURE: 70 MMHG | RESPIRATION RATE: 18 BRPM

## 2023-09-15 PROCEDURE — G0299 HHS/HOSPICE OF RN EA 15 MIN: HCPCS

## 2023-09-15 ASSESSMENT — ENCOUNTER SYMPTOMS: BOWEL INCONTINENCE: 1

## 2023-09-15 NOTE — HOME HEALTH
Subjective: I think I want to try being on my right side  Falls since last visit No(if yes complete the Fall Tracking Form and include bsrifallreport):   Caregiver involvement changes: patient has several paid caregivers and family members with her at all times when her  is not home  Home health supplies by type and quantity ordered/delivered this visit include: [id number], vashe, 4x4, collagen    Clinician asked if patient has had any physician contact since last home care visit and patient states: NO  Clinician asked if patient has any new or changed medications and patient states:  NO   If Yes, were medications reconciled? N/A medications reviewed  Was the certifying physician notified of changes in medications? 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 sacral wound previously staged at a 2 however upon assessment is now at a 4. [facility] NP office made aware but no return call received. Wound vac orders are in place however wound vac has not yet arrived to the home. per Jonesth Monday,  with [employer] by email received on 9/15/23 at 3:30pm \"Looks like they've made two attempts to contact the patient to schedule the delivery! As soon as patient confirms delivery (someone has to be home to sign for it when we deliver) then they will set it up for delivery. \"  Attempted to reach  to notify him of the above however went to voicemail. Text message was sent letting him know the above and to provide the number to [id number]. reply received \"okay\" from . Wounds show no s/s of infection or deterioration. Both wounds appear to be stable from last SN visit. Assessment is otherwise unremarkable. SN visits still needed for wound care.    VS within defined parameters, assessment completed, wound care

## 2023-09-18 ENCOUNTER — HOME CARE VISIT (OUTPATIENT)
Facility: HOME HEALTH | Age: 68
End: 2023-09-18
Payer: MEDICARE

## 2023-09-18 VITALS
OXYGEN SATURATION: 97 % | RESPIRATION RATE: 16 BRPM | DIASTOLIC BLOOD PRESSURE: 74 MMHG | HEART RATE: 66 BPM | SYSTOLIC BLOOD PRESSURE: 120 MMHG | TEMPERATURE: 97.5 F

## 2023-09-18 PROCEDURE — G0300 HHS/HOSPICE OF LPN EA 15 MIN: HCPCS

## 2023-09-18 ASSESSMENT — ENCOUNTER SYMPTOMS: BOWEL INCONTINENCE: 1

## 2023-09-18 NOTE — HOME HEALTH
Subjective: I feel fine. I didn't know I had a wound. Falls since last visit No(if yes complete the Fall Tracking Form and include bsrifallreport):   Caregiver involvement changes: Private aide present  Home health supplies by type and quantity ordered/delivered this visit include: foam was ordered yesterday    Clinician asked if patient has had any physician contact since last home care visit and patient states: NO  Clinician asked if patient has any new or changed medications and patient states:  NO   If Yes, were medications reconciled? N/A   Was the certifying physician notified of changes in medications? N/A     Clinical assessment (what this visit means for the patient overall and need for ongoing skilled care) and progress or lack of progress towards SPECIFIC goals: SN visited pt today for wound care and to apply wound vac. Upon arrival wound vac was not in the home and private aide stated she was there all day. Called  with reference number [id number] to check status. Spoke with Tico who said the delivery was moved to after hours after speaking with the  who said he does not get home until 6pm. Current wound care orders were performed. Noted copious amounts of zinc paste around and inside of sacral foam dressing. SN cleaned the zinc paste off and performed the correct orders. Wounds continue to fluxuate between getting smaller to larger and deeper. Wound vac will be applied at next SN visit. SN will continue to do wound care per MD orders. Written Teaching Material Utilized: Left a written step-by-step instruction sheet for applying moist to dry dressing in the event of wound vac failure. next SN will go over these instructions with pcg/private aide at next visit Wednesday. Interdisciplinary communication with:  RN for the purpose of Wound vac not being delivered and current wound dressings that were in place. Discharge planning as follows:  When wound is 100% healed and When

## 2023-09-20 ENCOUNTER — HOME CARE VISIT (OUTPATIENT)
Facility: HOME HEALTH | Age: 68
End: 2023-09-20
Payer: MEDICARE

## 2023-09-20 VITALS
OXYGEN SATURATION: 95 % | HEART RATE: 63 BPM | SYSTOLIC BLOOD PRESSURE: 114 MMHG | TEMPERATURE: 97.4 F | DIASTOLIC BLOOD PRESSURE: 68 MMHG | RESPIRATION RATE: 18 BRPM

## 2023-09-20 PROCEDURE — G0299 HHS/HOSPICE OF RN EA 15 MIN: HCPCS

## 2023-09-20 ASSESSMENT — ENCOUNTER SYMPTOMS: BOWEL INCONTINENCE: 1

## 2023-09-22 ENCOUNTER — HOME CARE VISIT (OUTPATIENT)
Facility: HOME HEALTH | Age: 68
End: 2023-09-22
Payer: MEDICARE

## 2023-09-22 VITALS
DIASTOLIC BLOOD PRESSURE: 70 MMHG | SYSTOLIC BLOOD PRESSURE: 120 MMHG | OXYGEN SATURATION: 96 % | HEART RATE: 68 BPM | RESPIRATION RATE: 18 BRPM | TEMPERATURE: 97.3 F

## 2023-09-22 PROCEDURE — G0299 HHS/HOSPICE OF RN EA 15 MIN: HCPCS

## 2023-09-22 ASSESSMENT — ENCOUNTER SYMPTOMS: BOWEL INCONTINENCE: 1

## 2023-09-23 NOTE — HOME HEALTH
Subjective: \"Is it healing\"  Falls since last visit No(if yes complete the Fall Tracking Form and include bsrifallreport):   Caregiver involvement changes: multiple different caregivers throughout the week  Home health supplies by type and quantity ordered/delivered this visit include: supplies in the home    Clinician asked if patient has had any physician contact since last home care visit and patient states: NO  Clinician asked if patient has any new or changed medications and patient states:  NO   If Yes, were medications reconciled? N/A   Was the certifying physician notified of changes in medications? N/A     Clinical assessment (what this visit means for the patient overall and need for ongoing skilled care) and progress or lack of progress towards SPECIFIC goals: patient with stage 4 pressure injuries to sacrum and right buttock. wound vac arrived and was applied at this SN visit. written step by step instructions on application of alternate dressing in the home. caregiver is aware and stated she will make the patients  aware of the location. educated caregiver on the importance of changing patients position to prevent further breakdown. caregiver reports that patients rash on her back was cleansed by  prior to him leaving for work. educated caregiver to notify home health office for any concerns with wound vac and office number is in the home. Assessment is otherwise unremarkable. VS within defined parameters, assessment completed, wound vac applied. Written Teaching Material Utilized: step by step written instructions on application of alternate dressing in the home. Interdisciplinary communication with: Ramsey Puente LPN for the purpose of POC collaboration    Discharge planning as follows:  When wound is 100% healed and Per physician order    Specific plan for next visit: wound vac dressing change

## 2023-09-23 NOTE — HOME HEALTH
Subjective: \"What are you doing here? \"  Falls since last visit No(if yes complete the Fall Tracking Form and include bsrifallreport):   Caregiver involvement changes: multiple caregivers throughout the week  Home health supplies by type and quantity ordered/delivered this visit include: supplies in the home    Clinician asked if patient has had any physician contact since last home care visit and patient states: NO  Clinician asked if patient has any new or changed medications and patient states:  NO   If Yes, were medications reconciled? N/A   Was the certifying physician notified of changes in medications? N/A     Clinical assessment (what this visit means for the patient overall and need for ongoing skilled care) and progress or lack of progress towards SPECIFIC goals: patient continues to require wound vac dressing changes to stage 4 pressure injuries to sacrum and right buttock. wound vac held suction from last SN visit. small amount of serosanguinous drainage noted in cannister. mild odor from last SN visit to wounds is no longer present. wound vac dressing was changed and bridge for suction was moved to avoid a new pressure injury. caregiver at todays SN visit states that she has difficulty changing the patients position by herself. patient was repositioned after dressing change and pressure to right buttock are was removed with pillow placement. assessment is otherwise unremarkable. VS within defined parameters, assessment completed, wound vac dressing changed. Written Teaching Material Utilized: wet to dry application instructions are in the home    Interdisciplinary communication with: Rick Pino RN for the purpose of POC collaboration    Discharge planning as follows:  When wound is 100% healed and Per physician order    Specific plan for next visit: continue with assessment and wound vac dressing changes

## 2023-09-25 ENCOUNTER — HOME CARE VISIT (OUTPATIENT)
Facility: HOME HEALTH | Age: 68
End: 2023-09-25
Payer: MEDICARE

## 2023-09-25 PROCEDURE — G0299 HHS/HOSPICE OF RN EA 15 MIN: HCPCS

## 2023-09-27 ENCOUNTER — HOME CARE VISIT (OUTPATIENT)
Facility: HOME HEALTH | Age: 68
End: 2023-09-27
Payer: MEDICARE

## 2023-09-27 PROCEDURE — G0300 HHS/HOSPICE OF LPN EA 15 MIN: HCPCS

## 2023-09-28 VITALS
OXYGEN SATURATION: 97 % | HEART RATE: 68 BPM | SYSTOLIC BLOOD PRESSURE: 120 MMHG | RESPIRATION RATE: 16 BRPM | TEMPERATURE: 98.4 F | DIASTOLIC BLOOD PRESSURE: 70 MMHG

## 2023-09-28 ASSESSMENT — ENCOUNTER SYMPTOMS
BOWEL INCONTINENCE: 1
FLATUS: 1

## 2023-09-28 NOTE — HOME HEALTH
Subjective: I dont have any pain. Falls since last visit No(if yes complete the Fall Tracking Form and include bsrifallreport):   Caregiver involvement changes: private aide present  Home health supplies by type and quantity ordered/delivered this visit include: n/a    Clinician asked if patient has had any physician contact since last home care visit and patient states: NO  Clinician asked if patient has any new or changed medications and patient states:  NO   If Yes, were medications reconciled? N/A   Was the certifying physician notified of changes in medications? N/A     Clinical assessment (what this visit means for the patient overall and need for ongoing skilled care) and progress or lack of progress towards SPECIFIC goals: SN visited pt today for wound care NWPT. Wound cultures obtained to both wounds separately and dropped off at [facility]. Wound healing goals not met. SN will continue to see patient for wound care/NWPT. Written Teaching Material Utilized: N/A    Interdisciplinary communication with: David QUINTERO for the purpose of lab dropped off at [facility]    Discharge planning as follows:  When wound is 100% healed, When patient is no longer able to participate or progresses to SNF/Hospice and When goals are met    Specific plan for next visit: Wound care, education to private aides

## 2023-09-29 ENCOUNTER — HOME CARE VISIT (OUTPATIENT)
Facility: HOME HEALTH | Age: 68
End: 2023-09-29
Payer: MEDICARE

## 2023-09-29 PROCEDURE — G0300 HHS/HOSPICE OF LPN EA 15 MIN: HCPCS

## 2023-09-30 LAB
MICROORGANISM/AGENT SPEC: ABNORMAL
MICROORGANISM/AGENT SPEC: ABNORMAL

## 2023-10-01 VITALS
RESPIRATION RATE: 18 BRPM | HEART RATE: 70 BPM | TEMPERATURE: 98.2 F | OXYGEN SATURATION: 97 % | SYSTOLIC BLOOD PRESSURE: 122 MMHG | DIASTOLIC BLOOD PRESSURE: 68 MMHG

## 2023-10-01 ASSESSMENT — ENCOUNTER SYMPTOMS: BOWEL INCONTINENCE: 1

## 2023-10-02 ENCOUNTER — HOME CARE VISIT (OUTPATIENT)
Facility: HOME HEALTH | Age: 68
End: 2023-10-02
Payer: MEDICARE

## 2023-10-02 ENCOUNTER — HOME CARE VISIT (OUTPATIENT)
Dept: HOME HEALTH SERVICES | Facility: HOME HEALTH | Age: 68
End: 2023-10-02
Payer: MEDICARE

## 2023-10-02 VITALS
HEART RATE: 65 BPM | DIASTOLIC BLOOD PRESSURE: 68 MMHG | RESPIRATION RATE: 16 BRPM | SYSTOLIC BLOOD PRESSURE: 118 MMHG | TEMPERATURE: 98 F | OXYGEN SATURATION: 93 %

## 2023-10-02 LAB
MICROORGANISM/AGENT SPEC: ABNORMAL
MICROORGANISM/AGENT SPEC: ABNORMAL

## 2023-10-02 PROCEDURE — G0300 HHS/HOSPICE OF LPN EA 15 MIN: HCPCS

## 2023-10-02 ASSESSMENT — ENCOUNTER SYMPTOMS
BOWEL INCONTINENCE: 1
PAIN LOCATION - PAIN QUALITY: JUST PAIN

## 2023-10-02 NOTE — HOME HEALTH
Subjective: I dont have any pain right now, I had a little earlier when I was being cleaned  Falls since last visit No(if yes complete the Fall Tracking Form and include bsrifallreport):   Caregiver involvement changes: Pcg / sister in law present  Home health supplies by type and quantity ordered/delivered this visit include: n/a    Clinician asked if patient has had any physician contact since last home care visit and patient states: NO  Clinician asked if patient has any new or changed medications and patient states:  NO   If Yes, were medications reconciled? N/A   Was the certifying physician notified of changes in medications? N/A     Clinical assessment (what this visit means for the patient overall and need for ongoing skilled care) and progress or lack of progress towards SPECIFIC goals: Sn visited pt today for wound care / NPWT. Wounds are stagnant at this time; pending wound cultures from last week. Pt was lying on her back with 2 small foam under her right side. Sn repositioned patient when wound care was completed to get her off her right/back side. Pt requires SN to continue with wound care per MD orders. Written Teaching Material Utilized: N/A    Interdisciplinary communication with: N/A     Discharge planning as follows:  When wound is 100% healed, Per physician order and When patient is no longer able to participate or progresses to SNF/Hospice    Specific plan for next visit: Re-instruct patient caregivers / private aides on Repositioning every 2 hours and keeping patient off her right side / back and Wound care

## 2023-10-03 VITALS
OXYGEN SATURATION: 95 % | HEART RATE: 75 BPM | TEMPERATURE: 98.7 F | DIASTOLIC BLOOD PRESSURE: 68 MMHG | SYSTOLIC BLOOD PRESSURE: 108 MMHG | RESPIRATION RATE: 24 BRPM

## 2023-10-03 ASSESSMENT — ENCOUNTER SYMPTOMS: HEMOPTYSIS: 0

## 2023-10-03 NOTE — HOME HEALTH
Subjective: Caregiver states patient does not like being on her side. Falls since last visit No  Caregiver involvement changes: Patient has multiple caregivers throughout week as reported by spouse. Home health supplies by type and quantity ordered/delivered this visit include: Not required; supplies in home    Clinician asked if patient has had any physician contact since last home care visit and patient states: NO  Clinician asked if patient has any new or changed medications and patient states:  NO   If Yes, were medications reconciled? N/A   Was the certifying physician notified of changes in medications? N/A     Clinical assessment (what this visit means for the patient overall and need for ongoing skilled care) and progress or lack of progress towards SPECIFIC goals: Vital signs, assessment, and NPWT completed to stage 4 pressure injuries to right buttuck and sacral pressure injuries. Wound Vac functioning properly; wound vac canister changed, canister filled 1/2 way filled with drainage. SN provided education to spouse improtance of protein intake with wound healing. SN provided examples of good sources of protien including premier protein shakes, chicken, beef, tuna fish, greek yougurt, beans, and peanut butter. SN educated spouse on importance of turning and repositioning every 2 hours and that wedges are a better option than pillows for offloading pressure on the hips, buttucks, and sacrum. SN provided patient with the number and address to [facility] "[employer] where foam wedges could be purchased. Spouse stated he would look into wedges and stated that he does reposition her every 2 hours, from back, to right side, to left side but he knows that aides are not repositioning her every 2 hours as they are supposed to and he is either going to need to replace them or quit his job to care for her full time. Patient at risk for wosening wounds due to lack of turning and repsotioning by caregivers.      Written

## 2023-10-04 ENCOUNTER — HOME CARE VISIT (OUTPATIENT)
Facility: HOME HEALTH | Age: 68
End: 2023-10-04
Payer: MEDICARE

## 2023-10-04 VITALS
RESPIRATION RATE: 18 BRPM | OXYGEN SATURATION: 95 % | HEART RATE: 62 BPM | DIASTOLIC BLOOD PRESSURE: 62 MMHG | TEMPERATURE: 97.8 F | SYSTOLIC BLOOD PRESSURE: 110 MMHG

## 2023-10-04 PROCEDURE — G0300 HHS/HOSPICE OF LPN EA 15 MIN: HCPCS

## 2023-10-04 ASSESSMENT — ENCOUNTER SYMPTOMS: BOWEL INCONTINENCE: 1

## 2023-10-04 NOTE — HOME HEALTH
Subjective: I feel fine. Falls since last visit No(if yes complete the Fall Tracking Form and include bsrifallreport):   Caregiver involvement changes: Spouse notified SN about pump leak this morning and applied a wet to dry dressing. Home health supplies by type and quantity ordered/delivered this visit include: collagen Ag rope    Clinician asked if patient has had any physician contact since last home care visit and patient states: NO  Clinician asked if patient has any new or changed medications and patient states:  YES Augmentin BID x10 days, Spouse aware and will  today  If Yes, were medications reconciled? NO not sure of the dosage  Was the certifying physician notified of changes in medications? N/A     Clinical assessment (what this visit means for the patient overall and need for ongoing skilled care) and progress or lack of progress towards SPECIFIC goals: Sn visited pt today for wound care/NPWT. Dressings had been removed this morning by spouse who said the pump lost pressure and he applied wet to dry dressings. Wound cultures came back positive for e-coli and patient is to start on antibiotic as soon as the  can . Wound care was performed, wound vac working with no issues. SN did demonstrate to pcg (sister in law) that when patient is on her side to be cleaned that the pump may decrease in pressure due to the wound being so close to the anus and it is a moist area. Also demonstrated to her how to put patient on her left side to keep her off her wounds for at least 1-2 hours as tolerated by patient and to try to do this at least every 2-3 hours. Pcg verbalized complete understanding. Wound healing goals not met. Due to infection wound healing stalled at this time. SN will continue to visit patient for wound care, assessment and continued educatin to multiple pcgs.     Written Teaching Material Utilized: N/A    Interdisciplinary communication with: JORGE Harrell for

## 2023-10-06 ENCOUNTER — HOME CARE VISIT (OUTPATIENT)
Facility: HOME HEALTH | Age: 68
End: 2023-10-06
Payer: MEDICARE

## 2023-10-06 PROCEDURE — G0300 HHS/HOSPICE OF LPN EA 15 MIN: HCPCS

## 2023-10-07 VITALS
DIASTOLIC BLOOD PRESSURE: 70 MMHG | OXYGEN SATURATION: 95 % | RESPIRATION RATE: 20 BRPM | TEMPERATURE: 97.4 F | SYSTOLIC BLOOD PRESSURE: 118 MMHG | HEART RATE: 68 BPM

## 2023-10-07 ASSESSMENT — ENCOUNTER SYMPTOMS: BOWEL INCONTINENCE: 1

## 2023-10-07 NOTE — HOME HEALTH
Subjective: No changes  Falls since last visit NO(if yes complete the Fall Tracking Form and include bsrifallreport):   Caregiver involvement changes: no  Home health supplies by type and quantity ordered/delivered this visit include: wc supplies    Clinician asked if patient has had any physician contact since last home care visit and patient states: NO  Clinician asked if patient has any new or changed medications and patient states:  NO   If Yes, were medications reconciled? NO   Was the certifying physician notified of changes in medications? NO     Clinical assessment (what this visit means for the patient overall and need for ongoing skilled care) and progress or lack of progress towards SPECIFIC goals: pt in need of continued sn for wound care  wound vac dc'd. collagen AG not in house, so used regular collagen  Written Teaching Material Utilized: N/A    Interdisciplinary communication with: N/A for the purpose of na    Discharge planning as follows:  When wound is 100% healed    Specific plan for next visit: wound care

## 2023-10-09 ENCOUNTER — HOME CARE VISIT (OUTPATIENT)
Facility: HOME HEALTH | Age: 68
End: 2023-10-09
Payer: MEDICARE

## 2023-10-09 VITALS
OXYGEN SATURATION: 96 % | DIASTOLIC BLOOD PRESSURE: 62 MMHG | TEMPERATURE: 98 F | HEART RATE: 62 BPM | RESPIRATION RATE: 18 BRPM | SYSTOLIC BLOOD PRESSURE: 116 MMHG

## 2023-10-09 PROCEDURE — G0300 HHS/HOSPICE OF LPN EA 15 MIN: HCPCS

## 2023-10-09 ASSESSMENT — ENCOUNTER SYMPTOMS
COUGH: 1
COUGH CHARACTERISTICS: DRY
FLATUS: 1
BOWEL INCONTINENCE: 1

## 2023-10-09 NOTE — HOME HEALTH
Subjective: I feel fine  Falls since last visit No(if yes complete the Fall Tracking Form and include bsrifallreport):   Caregiver involvement changes: sister in law present  Home health supplies by type and quantity ordered/delivered this visit include: Puracol silver, 5x5 foam, sure prep    Clinician asked if patient has had any physician contact since last home care visit and patient states: NO  Clinician asked if patient has any new or changed medications and patient states:  NO   If Yes, were medications reconciled? N/A   Was the certifying physician notified of changes in medications? N/A     Clinical assessment (what this visit means for the patient overall and need for ongoing skilled care) and progress or lack of progress towards SPECIFIC goals: Sn visited pt today for wound care. Wounds are stagnant with healing. Patient has been on her antibiotic for infection. vs within parameters. No c/o pain. Patient was lying on her back when nurse arrived though she did have pillows and foam under both left and right sides and under legs. It does not appear that the larger foam for repositioning has been obtained yet. Patient's sister in law was educated on reinforcement of foam and wound care in the event the foam becomes saturated, soiled with feces or begins to fall off. Wound healing goals not met. SN will continue to visit patient for wound care per MD orders. Written Teaching Material Utilized: N/A    Interdisciplinary communication with: N/A     Discharge planning as follows:  When wound is 100% healed and When patient is no longer able to participate or progresses to SNF/Hospice    Specific plan for next visit: Wound care

## 2023-10-10 ENCOUNTER — HOME CARE VISIT (OUTPATIENT)
Dept: HOME HEALTH SERVICES | Facility: HOME HEALTH | Age: 68
End: 2023-10-10
Payer: MEDICARE

## 2023-10-11 ENCOUNTER — HOME CARE VISIT (OUTPATIENT)
Facility: HOME HEALTH | Age: 68
End: 2023-10-11
Payer: MEDICARE

## 2023-10-11 VITALS
TEMPERATURE: 98.3 F | RESPIRATION RATE: 16 BRPM | SYSTOLIC BLOOD PRESSURE: 126 MMHG | DIASTOLIC BLOOD PRESSURE: 74 MMHG | HEART RATE: 60 BPM | OXYGEN SATURATION: 97 %

## 2023-10-11 PROCEDURE — G0300 HHS/HOSPICE OF LPN EA 15 MIN: HCPCS

## 2023-10-11 ASSESSMENT — ENCOUNTER SYMPTOMS: BOWEL INCONTINENCE: 1

## 2023-10-12 NOTE — HOME HEALTH
Subjective: I feel fine  Falls since last visit No(if yes complete the Fall Tracking Form and include bsrifallreport):   Caregiver involvement changes: n/a  Home health supplies by type and quantity ordered/delivered this visit include: n/a    Clinician asked if patient has had any physician contact since last home care visit and patient states: NO  Clinician asked if patient has any new or changed medications and patient states:  NO   If Yes, were medications reconciled? N/A   Was the certifying physician notified of changes in medications? N/A     Clinical assessment (what this visit means for the patient overall and need for ongoing skilled care) and progress or lack of progress towards SPECIFIC goals: SN visited pt today for assessment and wound care. Wounds were both saturated and zinc paste on areas of some maceration was cleaned off. It was discussed on MOnday with sister in law how to and when to use zinc paste and that there was no need to use thick amounts of zinc paste plus foam dressing. These are being remove dand cleaned at each visit. vs were within parameters. PT was positioned on her left side when SN left. SN will need to continue seeing patient for wound care. Written Teaching Material Utilized: N/A    Interdisciplinary communication with: N/A     Discharge planning as follows:  When wound is 100% healed, Per physician order and When patient is no longer able to participate or progresses to SNF/Hospice    Specific plan for next visit: Wound care

## 2023-10-13 ENCOUNTER — HOME CARE VISIT (OUTPATIENT)
Facility: HOME HEALTH | Age: 68
End: 2023-10-13
Payer: MEDICARE

## 2023-10-13 PROCEDURE — G0299 HHS/HOSPICE OF RN EA 15 MIN: HCPCS

## 2023-10-15 VITALS
DIASTOLIC BLOOD PRESSURE: 70 MMHG | HEART RATE: 57 BPM | TEMPERATURE: 97.5 F | OXYGEN SATURATION: 97 % | SYSTOLIC BLOOD PRESSURE: 120 MMHG

## 2023-10-15 ASSESSMENT — ENCOUNTER SYMPTOMS: BOWEL INCONTINENCE: 1

## 2023-10-16 ENCOUNTER — HOME CARE VISIT (OUTPATIENT)
Facility: HOME HEALTH | Age: 68
End: 2023-10-16

## 2023-10-16 VITALS
TEMPERATURE: 97.9 F | SYSTOLIC BLOOD PRESSURE: 120 MMHG | OXYGEN SATURATION: 96 % | RESPIRATION RATE: 16 BRPM | HEART RATE: 60 BPM | DIASTOLIC BLOOD PRESSURE: 64 MMHG

## 2023-10-16 PROCEDURE — G0299 HHS/HOSPICE OF RN EA 15 MIN: HCPCS

## 2023-10-16 ASSESSMENT — ENCOUNTER SYMPTOMS: BOWEL INCONTINENCE: 1

## 2023-10-18 ENCOUNTER — HOME CARE VISIT (OUTPATIENT)
Facility: HOME HEALTH | Age: 68
End: 2023-10-18

## 2023-10-18 VITALS
OXYGEN SATURATION: 98 % | HEART RATE: 60 BPM | RESPIRATION RATE: 16 BRPM | DIASTOLIC BLOOD PRESSURE: 62 MMHG | SYSTOLIC BLOOD PRESSURE: 108 MMHG | TEMPERATURE: 97 F

## 2023-10-18 PROCEDURE — G0299 HHS/HOSPICE OF RN EA 15 MIN: HCPCS

## 2023-10-20 ENCOUNTER — HOME CARE VISIT (OUTPATIENT)
Facility: HOME HEALTH | Age: 68
End: 2023-10-20

## 2023-10-20 VITALS
DIASTOLIC BLOOD PRESSURE: 72 MMHG | TEMPERATURE: 97.8 F | HEART RATE: 62 BPM | SYSTOLIC BLOOD PRESSURE: 130 MMHG | OXYGEN SATURATION: 95 % | RESPIRATION RATE: 16 BRPM

## 2023-10-20 PROCEDURE — G0299 HHS/HOSPICE OF RN EA 15 MIN: HCPCS

## 2023-10-20 ASSESSMENT — ENCOUNTER SYMPTOMS: BOWEL INCONTINENCE: 1

## 2023-10-20 NOTE — HOME HEALTH
Subjective: \"  Falls since last visit: No (if yes complete the Fall Tracking Form and include bsrifallreport):   Caregiver involvement changes: n/a  Home health supplies by type and quantity ordered/delivered this visit include: n/a    Clinician asked if patient has had any physician contact since last home care visit and patient states: NO  Clinician asked if patient has any new or changed medications and patient states:  NO   If Yes, were medications reconciled? N/A   Was the certifying physician notified of changes in medications? N/A     Clinical assessment (what this visit means for the patient overall and need for ongoing skilled care) and progress or lack of progress towards SPECIFIC goals: 65yo female patient who is bedbound requiring SN services for stage 4 wounds. Patient requires ongoing assessment, extensive wound care, and teaching to prevent infection and rehospitalization. Patient/CG progressing towards educational goals. Wound care goals not yet met. Patient will require ongoing visits from SN. Written Teaching Material Utilized: N/A    Interdisciplinary communication with: Chanelle Richter RN for the purpose of POC collaboration    Discharge planning as follows:  When goals are met    Specific plan for next visit: Assessment of wounds, wound care

## 2023-10-22 ASSESSMENT — ENCOUNTER SYMPTOMS: BOWEL INCONTINENCE: 1

## 2023-10-23 ENCOUNTER — HOME CARE VISIT (OUTPATIENT)
Facility: HOME HEALTH | Age: 68
End: 2023-10-23
Payer: MEDICARE

## 2023-10-23 VITALS
TEMPERATURE: 98.7 F | OXYGEN SATURATION: 94 % | RESPIRATION RATE: 18 BRPM | DIASTOLIC BLOOD PRESSURE: 54 MMHG | SYSTOLIC BLOOD PRESSURE: 110 MMHG | HEART RATE: 65 BPM

## 2023-10-23 PROCEDURE — G0300 HHS/HOSPICE OF LPN EA 15 MIN: HCPCS

## 2023-10-23 ASSESSMENT — ENCOUNTER SYMPTOMS: BOWEL INCONTINENCE: 1

## 2023-10-23 NOTE — HOME HEALTH
Subjective: I dont have any pain. Falls since last visit No(if yes complete the Fall Tracking Form and include bsrifallreport):   Caregiver involvement changes: private aide present  Home health supplies by type and quantity ordered/delivered this visit include: n/a    Clinician asked if patient has had any physician contact since last home care visit and patient states: NO  Clinician asked if patient has any new or changed medications and patient states:  NO   If Yes, were medications reconciled? N/A   Was the certifying physician notified of changes in medications? N/A     Clinical assessment (what this visit means for the patient overall and need for ongoing skilled care) and progress or lack of progress towards SPECIFIC goals: SN visited pt today for wound care to right buttock and sacral wounds. Pt's wounds continue to be stagnant in healing, no s/s of infection noted. Pt requires SN to continue to see for wound care. Written Teaching Material Utilized: N/A    Interdisciplinary communication with: N/A     Discharge planning as follows:  When wound is 100% healed, Per physician order and When patient is no longer able to participate or progresses to SNF/Hospice    Specific plan for next visit: wound care

## 2023-10-25 ENCOUNTER — HOME CARE VISIT (OUTPATIENT)
Facility: HOME HEALTH | Age: 68
End: 2023-10-25
Payer: MEDICARE

## 2023-10-25 VITALS
TEMPERATURE: 99.1 F | DIASTOLIC BLOOD PRESSURE: 68 MMHG | SYSTOLIC BLOOD PRESSURE: 116 MMHG | RESPIRATION RATE: 20 BRPM | HEART RATE: 78 BPM | OXYGEN SATURATION: 95 %

## 2023-10-25 PROCEDURE — G0300 HHS/HOSPICE OF LPN EA 15 MIN: HCPCS

## 2023-10-25 ASSESSMENT — ENCOUNTER SYMPTOMS
PAIN LOCATION - PAIN QUALITY: TENDERNESS
BOWEL INCONTINENCE: 1
STOOL DESCRIPTION: LOOSE

## 2023-10-25 NOTE — HOME HEALTH
Subjective: I have some tenderness on my back. Are you going to put a bandaid over it? Falls since last visit No(if yes complete the Fall Tracking Form and include bsrifallreport):   Caregiver involvement changes:  was in the home along with pts sister in law  Home health supplies by type and quantity ordered/delivered this visit include: 5x5 foam, silver collagen rope    Clinician asked if patient has had any physician contact since last home care visit and patient states: NO  Clinician asked if patient has any new or changed medications and patient states:  NO   If Yes, were medications reconciled? N/A   Was the certifying physician notified of changes in medications? N/A     Clinical assessment (what this visit means for the patient overall and need for ongoing skilled care) and progress or lack of progress towards SPECIFIC goals: Sn visited pt today for wound care. Patient has 2 pressure 4 wounds (one to sacral and one to right buttock). Wound to right buttock is progressively healing by evidence of decrease in meaurements. Wound to sacral is reducing in width but deeper. No s/s of infection noted. SN pushed on different areas of the back to see where the tenderness was that patient spoke of but was unable to say exactly where it was other than the rash. SN explained we are putting cream on it but it is left EDDIE. /pcg inquired on how or what we could use to prevent feces from getting into the sacral wound and SN explained due to the proximity of the wound and the anus being about one inch the best thing to do is to make sure there is a good seal on the foam dressing by using skin prep, applying a transparent dressing over it and keeping patient clean and dry wiping any feces away from the wound.  verbalized understanding of this. More wound care supplies were ordered as this SN used 3 packs of the collagen w/silver rope into the sacral wound.  Pt requires SN to continue with visits for wound

## 2023-10-27 ENCOUNTER — HOME CARE VISIT (OUTPATIENT)
Facility: HOME HEALTH | Age: 68
End: 2023-10-27
Payer: MEDICARE

## 2023-10-27 PROCEDURE — G0300 HHS/HOSPICE OF LPN EA 15 MIN: HCPCS

## 2023-10-28 VITALS
DIASTOLIC BLOOD PRESSURE: 62 MMHG | TEMPERATURE: 98.1 F | HEART RATE: 66 BPM | OXYGEN SATURATION: 95 % | RESPIRATION RATE: 18 BRPM | SYSTOLIC BLOOD PRESSURE: 110 MMHG

## 2023-10-28 ASSESSMENT — ENCOUNTER SYMPTOMS: BOWEL INCONTINENCE: 1

## 2023-10-28 NOTE — HOME HEALTH
Subjective: Pt lying in bed sleeping today. no c/o pain  Falls since last visit No(if yes complete the Fall Tracking Form and include bsrifallreport):   Caregiver involvement changes: patient had  with her today, private aide called out  Home health supplies by type and quantity ordered/delivered this visit include: n/a    Clinician asked if patient has had any physician contact since last home care visit and patient states: NO  Clinician asked if patient has any new or changed medications and patient states:  NO   If Yes, were medications reconciled? N/A   Was the certifying physician notified of changes in medications? N/A     Clinical assessment (what this visit means for the patient overall and need for ongoing skilled care) and progress or lack of progress towards SPECIFIC goals: SN visited pt today for wound care to right buttock and sacral wounds. Private aide had called out and  that was with patient today does not do patient care. SN performed wound care, changed patients brief and jadon pad and repositioned to her left side. No s/s of infeciton noted. vs were within parameters. PT requires SN to continue with visits for wound care. Written Teaching Material Utilized: N/A    Interdisciplinary communication with: N/A     Discharge planning as follows:  When wound is 100% healed and When patient is no longer able to participate or progresses to SNF/Hospice    Specific plan for next visit: Wound care

## 2023-10-30 ENCOUNTER — HOME CARE VISIT (OUTPATIENT)
Facility: HOME HEALTH | Age: 68
End: 2023-10-30
Payer: MEDICARE

## 2023-10-30 VITALS
SYSTOLIC BLOOD PRESSURE: 110 MMHG | RESPIRATION RATE: 20 BRPM | HEART RATE: 76 BPM | OXYGEN SATURATION: 96 % | TEMPERATURE: 98.5 F | DIASTOLIC BLOOD PRESSURE: 72 MMHG

## 2023-10-30 PROCEDURE — G0300 HHS/HOSPICE OF LPN EA 15 MIN: HCPCS

## 2023-10-30 ASSESSMENT — ENCOUNTER SYMPTOMS
STOOL DESCRIPTION: LOOSE
DIARRHEA: 1
BOWEL INCONTINENCE: 1

## 2023-10-30 NOTE — HOME HEALTH
Subjective: I dont have any pain  Falls since last visit No(if yes complete the Fall Tracking Form and include bsrifallreport):   Caregiver involvement changes: pcg/sister in law present, stating she is sleeping more than usual and isn't eating as much as she was before  Home health supplies by type and quantity ordered/delivered this visit include: n/a    Clinician asked if patient has had any physician contact since last home care visit and patient states: NO  Clinician asked if patient has any new or changed medications and patient states:  NO   If Yes, were medications reconciled? N/A   Was the certifying physician notified of changes in medications? N/A     Clinical assessment (what this visit means for the patient overall and need for ongoing skilled care) and progress or lack of progress towards SPECIFIC goals: SN visited pt today for wound care. Patient appeared to be more drowsy than usual today. Was finishing lunch when SN arrived but easily fell asleep while eating. Wound care was performed. Wound to right buttock continues to  progressively heal. Wound to sacrum is stagnant at this time but appears to be without s/s of infection. v/s were within parameters. Patient was dried and repositioned onto her left side as tolerated. PT requires SN for continued visits for wound care and infection control. Written Teaching Material Utilized: N/A    Interdisciplinary communication with: N/A     Discharge planning as follows:  When wound is 100% healed, Per physician order and When patient is no longer able to participate or progresses to SNF/Hospice    Specific plan for next visit: Wound care

## 2023-11-01 ENCOUNTER — HOME CARE VISIT (OUTPATIENT)
Facility: HOME HEALTH | Age: 68
End: 2023-11-01
Payer: MEDICARE

## 2023-11-01 VITALS
DIASTOLIC BLOOD PRESSURE: 72 MMHG | TEMPERATURE: 98 F | RESPIRATION RATE: 18 BRPM | HEART RATE: 63 BPM | OXYGEN SATURATION: 96 % | SYSTOLIC BLOOD PRESSURE: 118 MMHG

## 2023-11-01 PROCEDURE — G0300 HHS/HOSPICE OF LPN EA 15 MIN: HCPCS

## 2023-11-01 ASSESSMENT — ENCOUNTER SYMPTOMS: BOWEL INCONTINENCE: 1

## 2023-11-01 NOTE — HOME HEALTH
Subjective: I feel fine  Falls since last visit No(if yes complete the Fall Tracking Form and include bsrifallreport):   Caregiver involvement changes: private aide present - who states she has noticed a decrease in appetite, increase in sleeping and increase in agitation. Home health supplies by type and quantity ordered/delivered this visit include: n/a    Clinician asked if patient has had any physician contact since last home care visit and patient states: NO  Clinician asked if patient has any new or changed medications and patient states:  NO   If Yes, were medications reconciled? N/A   Was the certifying physician notified of changes in medications? N/A     Clinical assessment (what this visit means for the patient overall and need for ongoing skilled care) and progress or lack of progress towards SPECIFIC goals: SN visited pt today for wound care. Pt was lying in bed on her back. Wound to sacraum had a mild odor today and unable to determine if the odor was the wound or small amount of feces in brief. Wound care was performed. v/s were within parameters. No s/s of infection noted. Paperwork from [facility] was left in the home on the dining room table for the pcg/ to complete and give back to SN on Friday. Pcg was notified via text. Wound healing goals not met. Pt requires SN to continue with wound care, infection control, assessment and education as needed. Written Teaching Material Utilized: N/A    Interdisciplinary communication with: N/A    Discharge planning as follows:  When wound is 100% healed, Per physician order and When patient is no longer able to participate or progresses to SNF/Hospice    Specific plan for next visit: Wound care

## 2023-11-03 ENCOUNTER — HOME CARE VISIT (OUTPATIENT)
Facility: HOME HEALTH | Age: 68
End: 2023-11-03
Payer: MEDICARE

## 2023-11-03 VITALS
HEART RATE: 63 BPM | TEMPERATURE: 98.3 F | DIASTOLIC BLOOD PRESSURE: 60 MMHG | RESPIRATION RATE: 18 BRPM | SYSTOLIC BLOOD PRESSURE: 108 MMHG | OXYGEN SATURATION: 97 %

## 2023-11-03 PROCEDURE — G0300 HHS/HOSPICE OF LPN EA 15 MIN: HCPCS

## 2023-11-03 ASSESSMENT — ENCOUNTER SYMPTOMS: BOWEL INCONTINENCE: 1

## 2023-11-06 ENCOUNTER — HOME CARE VISIT (OUTPATIENT)
Facility: HOME HEALTH | Age: 68
End: 2023-11-06
Payer: MEDICARE

## 2023-11-06 VITALS
HEART RATE: 65 BPM | DIASTOLIC BLOOD PRESSURE: 62 MMHG | RESPIRATION RATE: 18 BRPM | TEMPERATURE: 98.3 F | SYSTOLIC BLOOD PRESSURE: 106 MMHG | OXYGEN SATURATION: 95 %

## 2023-11-06 PROCEDURE — G0300 HHS/HOSPICE OF LPN EA 15 MIN: HCPCS

## 2023-11-06 ASSESSMENT — ENCOUNTER SYMPTOMS: BOWEL INCONTINENCE: 1

## 2023-11-06 NOTE — HOME HEALTH
Subjective: No pain  Falls since last visit No(if yes complete the Fall Tracking Form and include bsrifallreport):   Caregiver involvement changes: private aide present  Home health supplies by type and quantity ordered/delivered this visit include: n/a    Clinician asked if patient has had any physician contact since last home care visit and patient states: NO  Clinician asked if patient has any new or changed medications and patient states:  NO   If Yes, were medications reconciled? N/A   Was the certifying physician notified of changes in medications? N/A     Clinical assessment (what this visit means for the patient overall and need for ongoing skilled care) and progress or lack of progress towards SPECIFIC goals: Sn visited pt today for wound care to right buttock and sacral wounds. Patient was observed lying on her back with pillows offsetting the right side of the body. Wound care was performed. Wound to right buttock is rapidly healing. no s/s of infection noted. Wound healing goals not met. v/s within parameters. Pt was repositioned on her left side when SN left. Education to turn and reposition every 2 hours was given to private aide. SN also instructed this aide to not perform wound care, but that if the dressing appeared to be loose it could be reinforced with transparent dressing and/or if compromised with feces that the dressing could be removed and replace with a clean dressing. Pt requires SN to continue with visits for wound care and infection prevention. Written Teaching Material Utilized: N/A    Interdisciplinary communication with:  RN for the purpose of POC collaboration    Discharge planning as follows:  When wound is 100% healed and When patient is no longer able to participate or progresses to SNF/Hospice    Specific plan for next visit: Wound care

## 2023-11-08 ENCOUNTER — HOME CARE VISIT (OUTPATIENT)
Facility: HOME HEALTH | Age: 68
End: 2023-11-08
Payer: MEDICARE

## 2023-11-08 VITALS
SYSTOLIC BLOOD PRESSURE: 122 MMHG | HEART RATE: 64 BPM | RESPIRATION RATE: 16 BRPM | DIASTOLIC BLOOD PRESSURE: 64 MMHG | TEMPERATURE: 99.3 F | OXYGEN SATURATION: 95 %

## 2023-11-08 PROCEDURE — G0300 HHS/HOSPICE OF LPN EA 15 MIN: HCPCS

## 2023-11-08 ASSESSMENT — ENCOUNTER SYMPTOMS: BOWEL INCONTINENCE: 1

## 2023-11-09 NOTE — HOME HEALTH
Subjective: I dont have any pain. How's the wound doing? Falls since last visit No(if yes complete the Fall Tracking Form and include bsrifallreport):   Caregiver involvement changes: sitter present  Home health supplies by type and quantity ordered/delivered this visit include: n/a    Clinician asked if patient has had any physician contact since last home care visit and patient states: NO  Clinician asked if patient has any new or changed medications and patient states:  NO   If Yes, were medications reconciled? N/A   Was the certifying physician notified of changes in medications? N/A     Clinical assessment (what this visit means for the patient overall and need for ongoing skilled care) and progress or lack of progress towards SPECIFIC goals: Sn visited pt today for wound care. Patient was lying in the bed with the right side of her body propped up with pillows. Pt had no c/o pain or any issues. v/s were within parameters. All wound care supplies arrived except silver collagen which should be delivered today or tomorrow. Pcg/ is aware. Wound to right buttock continues to progressively heal. Wound to sacrum is slow to heal. No s/s of infection noted. SN dried/changed patients brief and repositioned onto her left side as patient tolerated. Wound healing goals not met. SN will need to continue with visits for wound care and continued education to caregivers. Written Teaching Material Utilized: N/A    Interdisciplinary communication with: N/A    Discharge planning as follows:  When wound is 100% healed    Specific plan for next visit: Wound care

## 2023-11-10 ENCOUNTER — HOME CARE VISIT (OUTPATIENT)
Facility: HOME HEALTH | Age: 68
End: 2023-11-10
Payer: MEDICARE

## 2023-11-10 VITALS
TEMPERATURE: 98.4 F | RESPIRATION RATE: 18 BRPM | SYSTOLIC BLOOD PRESSURE: 108 MMHG | HEART RATE: 68 BPM | DIASTOLIC BLOOD PRESSURE: 62 MMHG | OXYGEN SATURATION: 96 %

## 2023-11-10 PROCEDURE — G0300 HHS/HOSPICE OF LPN EA 15 MIN: HCPCS

## 2023-11-10 ASSESSMENT — ENCOUNTER SYMPTOMS
COUGH CHARACTERISTICS: DRY
BOWEL INCONTINENCE: 1
COUGH: 1

## 2023-11-11 NOTE — HOME HEALTH
Subjective: I dont have any pain  Falls since last visit No(if yes complete the Fall Tracking Form and include bsrifallreport):   Caregiver involvement changes:  was present but unwilling to participate in wound care  Home health supplies by type and quantity ordered/delivered this visit include: n/a    Clinician asked if patient has had any physician contact since last home care visit and patient states: NO  Clinician asked if patient has any new or changed medications and patient states:  NO   If Yes, were medications reconciled? N/A   Was the certifying physician notified of changes in medications? N/A     Clinical assessment (what this visit means for the patient overall and need for ongoing skilled care) and progress or lack of progress towards SPECIFIC goals: Sn visited pt today for wound care.  was present in the room but fell asleep in the recliner. SN unable to educate pcg on the appropriate wound care/infection control. Wound to right buttock continues to heal and is without s/s of infection. Wound to sacral area continues to be stagnant, no s/s of infection noted. v/s were within parameters. Pt requires SN to continue with visits for wound care per MD orders and for infection prevention. Pcg has not been checked off on wound care by any clinician as he is usually not in the home related to work. Patient was repositioned onto her left side to offload the right side/sacral areas. Written Teaching Material Utilized: N/A    Interdisciplinary communication with:  RN and Physician for the purpose of wound to sacral area    Discharge planning as follows:  When wound is 100% healed and Per physician order    Specific plan for next visit: Wound care

## 2023-11-13 ENCOUNTER — HOME CARE VISIT (OUTPATIENT)
Facility: HOME HEALTH | Age: 68
End: 2023-11-13
Payer: MEDICARE

## 2023-11-13 VITALS — OXYGEN SATURATION: 96 % | TEMPERATURE: 97.4 F | HEART RATE: 62 BPM

## 2023-11-13 PROCEDURE — G0300 HHS/HOSPICE OF LPN EA 15 MIN: HCPCS

## 2023-11-13 ASSESSMENT — ENCOUNTER SYMPTOMS: BOWEL INCONTINENCE: 1

## 2023-11-14 NOTE — HOME HEALTH
done and this SN did not see where patient is being turned and repositioned off her right buttock/sacral areas. Pt will require SN to continue with visits for wound care per MD orders. Written Teaching Material Utilized: N/A    Interdisciplinary communication with:  Physician for the purpose of wound status. Discharge planning as follows:  When wound is 100% healed and When patient is no longer able to participate or progresses to SNF/Hospice    Specific plan for next visit: Wound care

## 2023-11-15 ENCOUNTER — HOME CARE VISIT (OUTPATIENT)
Facility: HOME HEALTH | Age: 68
End: 2023-11-15
Payer: MEDICARE

## 2023-11-15 VITALS
DIASTOLIC BLOOD PRESSURE: 70 MMHG | TEMPERATURE: 98.4 F | SYSTOLIC BLOOD PRESSURE: 108 MMHG | RESPIRATION RATE: 16 BRPM | HEART RATE: 69 BPM | OXYGEN SATURATION: 97 %

## 2023-11-15 PROCEDURE — G0300 HHS/HOSPICE OF LPN EA 15 MIN: HCPCS

## 2023-11-15 ASSESSMENT — ENCOUNTER SYMPTOMS: BOWEL INCONTINENCE: 1

## 2023-11-16 NOTE — HOME HEALTH
Subjective: I don't have any pain. Falls since last visit No(if yes complete the Fall Tracking Form and include bsrifallreport):   Caregiver involvement changes: private aide present  Home health supplies by type and quantity ordered/delivered this visit include: n/a    Clinician asked if patient has had any physician contact since last home care visit and patient states: NO  Clinician asked if patient has any new or changed medications and patient states:  NO   If Yes, were medications reconciled? N/A   Was the certifying physician notified of changes in medications? N/A     Clinical assessment (what this visit means for the patient overall and need for ongoing skilled care) and progress or lack of progress towards SPECIFIC goals: SN visited pt today for wound care. Upon arrival patient was lying on her back. When about to remove the pillows that usually are put under each arm and each side the private aide said she didn't have any pillows under her and she had been on her back since her arrival.  started a log for private aides and SN to complete as to what has been done. When reviewed today it appears the sitter that was there this morning is a [employer] memeber and she does not actually do any brief changes or turning/repositioning. Wound care was performed. v/s were within parameters. Sacral wound had an odor today. SN called Dr. Kamila Alvarez office and spoke to Jammie who took my information for someone to call me back about wound, odor and request for wound culture. SN changed patient's brief and turned her onto her left side for offloading. Pt will require ongoing visits with SN to complete wound care per MD orders. This SN did not receive a call back from Dr. Kamila Alvarez office from the message left on Tuesday.     Written Teaching Material Utilized: N/A    Interdisciplinary communication with: Dr. Flavia Lemos NP office for the purpose of sacral wound / odor / wound culture request    Discharge

## 2023-11-17 ENCOUNTER — HOME CARE VISIT (OUTPATIENT)
Facility: HOME HEALTH | Age: 68
End: 2023-11-17
Payer: MEDICARE

## 2023-11-17 VITALS
DIASTOLIC BLOOD PRESSURE: 68 MMHG | SYSTOLIC BLOOD PRESSURE: 114 MMHG | OXYGEN SATURATION: 96 % | RESPIRATION RATE: 18 BRPM | HEART RATE: 65 BPM | TEMPERATURE: 97.8 F

## 2023-11-17 PROCEDURE — G0300 HHS/HOSPICE OF LPN EA 15 MIN: HCPCS

## 2023-11-17 ASSESSMENT — ENCOUNTER SYMPTOMS
DIARRHEA: 1
BOWEL INCONTINENCE: 1
STOOL DESCRIPTION: LIQUID

## 2023-11-18 NOTE — HOME HEALTH
Subjective: I dont have any pain  Falls since last visit No(if yes complete the Fall Tracking Form and include bsrifallreport):   Caregiver involvement changes: sister in law present  Home health supplies by type and quantity ordered/delivered this visit include: puracol ag rope    Clinician asked if patient has had any physician contact since last home care visit and patient states: NO  Clinician asked if patient has any new or changed medications and patient states:  NO   If Yes, were medications reconciled? N/A   Was the certifying physician notified of changes in medications? N/A     Clinical assessment (what this visit means for the patient overall and need for ongoing skilled care) and progress or lack of progress towards SPECIFIC goals: SN visited pt today for wound care. Patient was lying on her back wtih pillows under her arms and legs when SN arrived. No offloading to the wound(s). Wound care was performed. v/s were within parameters. Patient had a small amount but continual diarrhea during wound care and had to keep stopping to clean up patient. After wound care was completed patient was changed, clean chux under and repositioned onto her left side. Patient complained that she did not want to keep being left on her side but SN educated pt that she needs to be off her wounds in order for them to heal. She said ok. Physician did not return calls from Tuesday or Wednesday about sacral wound, request for wound culture, the lack of repositioning the patient to aide in wound healing. Patient is at risk for infection and rehospitalization and requires SN to continue with visits for wound care per MD orders. Written Teaching Material Utilized: N/A    Interdisciplinary communication with: N/A     Discharge planning as follows:  When wound is 100% healed and When patient is no longer able to participate or progresses to SNF/Hospice    Specific plan for next visit: Wound care

## 2023-11-20 ENCOUNTER — HOME CARE VISIT (OUTPATIENT)
Facility: HOME HEALTH | Age: 68
End: 2023-11-20
Payer: MEDICARE

## 2023-11-20 VITALS
DIASTOLIC BLOOD PRESSURE: 68 MMHG | HEART RATE: 69 BPM | SYSTOLIC BLOOD PRESSURE: 110 MMHG | OXYGEN SATURATION: 96 % | TEMPERATURE: 98.4 F | RESPIRATION RATE: 16 BRPM

## 2023-11-20 PROCEDURE — G0300 HHS/HOSPICE OF LPN EA 15 MIN: HCPCS

## 2023-11-20 ASSESSMENT — ENCOUNTER SYMPTOMS
STOOL DESCRIPTION: SOFT FORMED
BOWEL INCONTINENCE: 1

## 2023-11-21 ENCOUNTER — HOME CARE VISIT (OUTPATIENT)
Dept: HOME HEALTH SERVICES | Facility: HOME HEALTH | Age: 68
End: 2023-11-21
Payer: MEDICARE

## 2023-11-21 NOTE — HOME HEALTH
Subjective: I dont have any pain  Falls since last visit No(if yes complete the Fall Tracking Form and include bsrifallreport):   Caregiver involvement changes:  was present  Home health supplies by type and quantity ordered/delivered this visit include: vashe    Clinician asked if patient has had any physician contact since last home care visit and patient states: NO  Clinician asked if patient has any new or changed medications and patient states:  NO   If Yes, were medications reconciled? N/A   Was the certifying physician notified of changes in medications? N/A     Clinical assessment (what this visit means for the patient overall and need for ongoing skilled care) and progress or lack of progress towards SPECIFIC goals: Sn visited pt today for wound care to right buttock and sacral wounds. Wound care was performed Both wounds were coverd with transparent dressings to help aide in feces and/or urine from getting into either wound. Wound to right buttock continues to progressively heal. Wound healing goals not met. Wound to sacrum continues to deteriorate, getting deeper with tunneling. SN called the MD office twice last week leaving messages for a return call to make aware about the wounds, non-compliance with turning and repositionging with private aides and requested wound culture. No return calls were made to this clinician by the MD office. Rene RN nurse educator and Jocelyn QUINTERO Case manager will meet with patients spouse about these issues. v/s were within parameters. Today when SN arrived patient was slightly on her left side offloading the right buttock wound with pillows. SN changed patient who had a small b/m and was repositioned onto her left side. Pcg/sister in law had arrived as SN was completeing and was made aware the patient had been changed and repositoned. Pt requires SN to continue with visits for wound care per MD orders to prevent infection and rehospitalization.     Written

## 2023-11-22 ENCOUNTER — TELEPHONE (OUTPATIENT)
Facility: CLINIC | Age: 68
End: 2023-11-22

## 2023-11-22 ENCOUNTER — HOME CARE VISIT (OUTPATIENT)
Facility: HOME HEALTH | Age: 68
End: 2023-11-22
Payer: MEDICARE

## 2023-11-22 VITALS
DIASTOLIC BLOOD PRESSURE: 64 MMHG | SYSTOLIC BLOOD PRESSURE: 110 MMHG | TEMPERATURE: 99.3 F | OXYGEN SATURATION: 97 % | HEART RATE: 65 BPM | RESPIRATION RATE: 16 BRPM

## 2023-11-22 PROCEDURE — G0300 HHS/HOSPICE OF LPN EA 15 MIN: HCPCS

## 2023-11-22 ASSESSMENT — ENCOUNTER SYMPTOMS
DIARRHEA: 1
BOWEL INCONTINENCE: 1

## 2023-11-23 ENCOUNTER — HOME CARE VISIT (OUTPATIENT)
Dept: HOME HEALTH SERVICES | Facility: HOME HEALTH | Age: 68
End: 2023-11-23
Payer: MEDICARE

## 2023-11-23 NOTE — HOME HEALTH
Subjective: I dont have any pain  Falls since last visit No(if yes complete the Fall Tracking Form and include bsrifallreport):   Caregiver involvement changes: spouse present  Home health supplies by type and quantity ordered/delivered this visit include: n/a    Clinician asked if patient has had any physician contact since last home care visit and patient states: NO - Patient was scheduled to see her PCP today  Clinician asked if patient has any new or changed medications and patient states:  NO   If Yes, were medications reconciled? N/A   Was the certifying physician notified of changes in medications? N/A     Clinical assessment (what this visit means for the patient overall and need for ongoing skilled care) and progress or lack of progress towards SPECIFIC goals: SN visited pt today for assessment and wound care. Patient was lying on her back with pillows only under her legs to float her heels. Spouse was present today and said they did not make the PCP appointment due to his wife being difficult and not allowing him to get her ready for the appointment so he cancelled the transportation and PCP appointment. Spouse also said she was like this most of the day, refusing to eat, being difficult to change and said he had no repositioned her since this morning. Wound care was performed. Patient was changed. She did have a bowel movement. Patient was positioned on her left side. Patient was calm during my visit and did not voice any concerns. Wound healing goals not yet met. Pcg/spouse did inquire about [facility] and Home Based care which was discussed at a meeting last night and said he is looking forward to them coming out since he is having a harder time getting his wife out to appointments as evidenced by today. Pt requires SN to continue with visits for wound care per MD orders to prevent infection and rehospitalization.     Written Teaching Material Utilized: N/A    Interdisciplinary communication with: N/A

## 2023-11-24 ENCOUNTER — HOME CARE VISIT (OUTPATIENT)
Facility: HOME HEALTH | Age: 68
End: 2023-11-24
Payer: MEDICARE

## 2023-11-24 ENCOUNTER — HOME CARE VISIT (OUTPATIENT)
Dept: HOME HEALTH SERVICES | Facility: HOME HEALTH | Age: 68
End: 2023-11-24
Payer: MEDICARE

## 2023-11-24 PROCEDURE — G0299 HHS/HOSPICE OF RN EA 15 MIN: HCPCS

## 2023-11-27 NOTE — TELEPHONE ENCOUNTER
[facility]   (formerly [facility])   Phone:  [phone]      Fax:  [phone] [address], [address]  [place] [address]    Name:  So Bridges  YOB: 1955    11/22/23:  Received [facility] referral for So Bridges from Keerthi at [facility].    This nurse called to speak with patient, no answer, left message.    11/27/23:  This nurse called patient's  and caregiver Dr. Frederick Bridges.  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    Dr. Bridges returned call at 4:57pm.  This nurse explained [facility] services.   Dr. Bridges states that he isn't sure that he wants PC services through [facility].  He requests that this nurse email information about PC practice to him at [email or web address], this nurse emailed a [facility] flyer to him.      Ban Quinteros RN, Gerontological Nursing-BC, [facility]  Referral Navigator, [facility]

## 2024-08-12 ENCOUNTER — TELEPHONE (OUTPATIENT)
Age: 69
End: 2024-08-12

## 2024-08-12 NOTE — TELEPHONE ENCOUNTER
Attempted to call patient and schedule new patient appt. No answer; left vm.     NP/ Celestino/ Jo-Ann Marie     Next Available
